# Patient Record
Sex: FEMALE | HISPANIC OR LATINO | Employment: FULL TIME | URBAN - METROPOLITAN AREA
[De-identification: names, ages, dates, MRNs, and addresses within clinical notes are randomized per-mention and may not be internally consistent; named-entity substitution may affect disease eponyms.]

---

## 2019-05-31 ENCOUNTER — OFFICE VISIT (OUTPATIENT)
Dept: INTERNAL MEDICINE | Facility: CLINIC | Age: 24
End: 2019-05-31

## 2019-05-31 VITALS
HEIGHT: 61 IN | OXYGEN SATURATION: 98 % | SYSTOLIC BLOOD PRESSURE: 102 MMHG | BODY MASS INDEX: 30.21 KG/M2 | WEIGHT: 160 LBS | DIASTOLIC BLOOD PRESSURE: 70 MMHG | TEMPERATURE: 98.2 F | HEART RATE: 76 BPM | RESPIRATION RATE: 16 BRPM

## 2019-05-31 DIAGNOSIS — J02.0 STREP THROAT: Primary | ICD-10-CM

## 2019-05-31 DIAGNOSIS — J35.1 TONSILLAR HYPERTROPHY: ICD-10-CM

## 2019-05-31 PROCEDURE — 99203 OFFICE O/P NEW LOW 30 MIN: CPT | Performed by: NURSE PRACTITIONER

## 2019-05-31 RX ORDER — AMOXICILLIN 875 MG/1
TABLET, COATED ORAL
Refills: 0 | COMMUNITY
Start: 2019-05-25 | End: 2019-07-26

## 2019-05-31 NOTE — PROGRESS NOTES
Subjective   Lizzie Farrar is a 24 y.o. female here to establish care.  Chief Complaint   Patient presents with   • Establish Care     Pt states she has been diagnosed with strep 4 times over the past year, and she would like to discuss.       Sore Throat    This is a recurrent problem. The current episode started in the past 7 days. The problem has been unchanged. Neither side of throat is experiencing more pain than the other. There has been no fever. The pain is mild. Associated symptoms include congestion. Pertinent negatives include no abdominal pain, coughing, diarrhea, drooling, ear discharge, ear pain, headaches, hoarse voice, plugged ear sensation, neck pain, shortness of breath, stridor, swollen glands, trouble swallowing or vomiting. She has had exposure to strep. She has had no exposure to mono. Treatments tried: abx. The treatment provided mild relief.    Has been diagnosed with step 4 times in the last year. Reports tonsils stay swollen. Would like to see ENT      The following portions of the patient's history were reviewed and updated as appropriate: allergies, current medications, past family history, past medical history, past social history, past surgical history and problem list.    Review of Systems   Constitutional: Negative for chills, diaphoresis, fatigue, fever and unexpected weight change.   HENT: Positive for congestion and sore throat. Negative for drooling, ear discharge, ear pain, hoarse voice and trouble swallowing.    Eyes: Negative.    Respiratory: Negative for cough, chest tightness, shortness of breath, wheezing and stridor.    Cardiovascular: Negative for chest pain, palpitations and leg swelling.   Gastrointestinal: Negative for abdominal pain, constipation, diarrhea, nausea and vomiting.   Genitourinary: Negative for difficulty urinating and dysuria.   Musculoskeletal: Negative for neck pain.   Skin: Negative for color change and rash.   Allergic/Immunologic: Negative for  "environmental allergies.   Neurological: Negative for dizziness, syncope, weakness and headaches.   Psychiatric/Behavioral: Negative for sleep disturbance.     Blood pressure 102/70, pulse 76, temperature 98.2 °F (36.8 °C), temperature source Temporal, resp. rate 16, height 154.9 cm (61\"), weight 72.6 kg (160 lb), SpO2 98 %.    No Known Allergies  Past Medical History:   Diagnosis Date   • Strep throat      Past Surgical History:   Procedure Laterality Date   • NO PAST SURGERIES       Family History   Problem Relation Age of Onset   • Hyperlipidemia Mother    • Hypertension Maternal Grandmother    • No Known Problems Father    • Cirrhosis Maternal Uncle         alcohol   • Stroke Neg Hx    • Heart attack Neg Hx    • Breast cancer Neg Hx    • Cervical cancer Neg Hx    • Lung cancer Neg Hx      Social History     Socioeconomic History   • Marital status: Single     Spouse name: Not on file   • Number of children: 0   • Years of education: Not on file   • Highest education level: Some college, no degree   Occupational History   • Occupation: Medical assistant   Social Needs   • Financial resource strain: Not hard at all   • Food insecurity:     Worry: Never true     Inability: Never true   • Transportation needs:     Medical: No     Non-medical: No   Tobacco Use   • Smoking status: Never Smoker   • Smokeless tobacco: Never Used   Substance and Sexual Activity   • Alcohol use: Yes     Frequency: Monthly or less     Drinks per session: 1 or 2     Binge frequency: Less than monthly   • Drug use: No   • Sexual activity: Yes     Partners: Male   Lifestyle   • Physical activity:     Days per week: 3 days     Minutes per session: 30 min   • Stress: Not at all   Social History Narrative    Lives with her siblings     There is no immunization history for the selected administration types on file for this patient.    Current Outpatient Medications:   •  amoxicillin (AMOXIL) 875 MG tablet, TK 1 T PO  BID FOR 10 DAYS, Disp: , Rfl: " 0    Objective   Physical Exam   Constitutional: She is oriented to person, place, and time. She appears well-developed and well-nourished.   HENT:   Head: Normocephalic and atraumatic.   Mouth/Throat: Uvula is midline and mucous membranes are normal. Posterior oropharyngeal erythema present. No posterior oropharyngeal edema. Tonsils are 2+ on the right. Tonsils are 2+ on the left. No tonsillar exudate.   Eyes: Conjunctivae are normal. Pupils are equal, round, and reactive to light.   Neck: Normal range of motion.   Cardiovascular: Normal rate, regular rhythm and normal heart sounds.   Pulmonary/Chest: Effort normal and breath sounds normal.   Abdominal: Soft. Normal appearance and bowel sounds are normal. There is no tenderness.   Musculoskeletal: Normal range of motion.   Neurological: She is alert and oriented to person, place, and time.   Skin: Skin is warm and dry.   Psychiatric: She has a normal mood and affect. Her behavior is normal. Judgment and thought content normal.       Assessment/Plan   Lizzie was seen today for establish care.    Diagnoses and all orders for this visit:    Strep throat  -     Ambulatory Referral to ENT (Otolaryngology)    Tonsillar hypertrophy  -     Ambulatory Referral to ENT (Otolaryngology)    Other orders  -     Cancel: Tdap Vaccine Greater Than or Equal To 8yo IM             Finish amoxicillin.   Refer to ENT for evaluation.   Discussed prevention of spread/reinfection with strep.   Return in about 6 weeks (around 7/12/2019) for Annual, with fasting labs, with pap.and Tdap  Plan of care discussed with pt. They verbalized understanding and agreement.       * Please note that portions of this note were completed with a voice recognition program. Efforts were made to edit the dictation but occasionally words are erroneously transcribed.

## 2019-07-26 ENCOUNTER — OFFICE VISIT (OUTPATIENT)
Dept: INTERNAL MEDICINE | Facility: CLINIC | Age: 24
End: 2019-07-26

## 2019-07-26 VITALS
WEIGHT: 156.2 LBS | BODY MASS INDEX: 29.49 KG/M2 | HEART RATE: 67 BPM | DIASTOLIC BLOOD PRESSURE: 72 MMHG | OXYGEN SATURATION: 99 % | HEIGHT: 61 IN | RESPIRATION RATE: 16 BRPM | TEMPERATURE: 98.2 F | SYSTOLIC BLOOD PRESSURE: 100 MMHG

## 2019-07-26 DIAGNOSIS — Z11.3 SCREEN FOR STD (SEXUALLY TRANSMITTED DISEASE): ICD-10-CM

## 2019-07-26 DIAGNOSIS — Z28.39 HEPATITIS A VACCINATION NOT UP TO DATE: ICD-10-CM

## 2019-07-26 DIAGNOSIS — Z23 NEED FOR TDAP VACCINATION: ICD-10-CM

## 2019-07-26 DIAGNOSIS — Z00.00 ANNUAL PHYSICAL EXAM: Primary | ICD-10-CM

## 2019-07-26 LAB
25(OH)D3 SERPL-MCNC: 11 NG/ML (ref 30–100)
ALBUMIN SERPL-MCNC: 3.9 G/DL (ref 3.5–5.2)
ALBUMIN/GLOB SERPL: 1.3 G/DL
ALP SERPL-CCNC: 54 U/L (ref 39–117)
ALT SERPL W P-5'-P-CCNC: 20 U/L (ref 1–33)
ANION GAP SERPL CALCULATED.3IONS-SCNC: 9.9 MMOL/L (ref 5–15)
AST SERPL-CCNC: 24 U/L (ref 1–32)
BILIRUB BLD-MCNC: NEGATIVE MG/DL
BILIRUB SERPL-MCNC: 0.2 MG/DL (ref 0.2–1.2)
BUN BLD-MCNC: 6 MG/DL (ref 6–20)
BUN/CREAT SERPL: 9 (ref 7–25)
CALCIUM SPEC-SCNC: 8.8 MG/DL (ref 8.6–10.5)
CHLORIDE SERPL-SCNC: 100 MMOL/L (ref 98–107)
CHOLEST SERPL-MCNC: 136 MG/DL (ref 0–200)
CLARITY, POC: CLEAR
CO2 SERPL-SCNC: 27.1 MMOL/L (ref 22–29)
COLOR UR: YELLOW
CREAT BLD-MCNC: 0.67 MG/DL (ref 0.57–1)
DEPRECATED RDW RBC AUTO: 43 FL (ref 37–54)
ERYTHROCYTE [DISTWIDTH] IN BLOOD BY AUTOMATED COUNT: 12.6 % (ref 12.3–15.4)
GFR SERPL CREATININE-BSD FRML MDRD: 108 ML/MIN/1.73
GFR SERPL CREATININE-BSD FRML MDRD: 131 ML/MIN/1.73
GLOBULIN UR ELPH-MCNC: 3 GM/DL
GLUCOSE BLD-MCNC: 93 MG/DL (ref 65–99)
GLUCOSE UR STRIP-MCNC: NEGATIVE MG/DL
HAV IGM SERPL QL IA: NORMAL
HBV CORE IGM SERPL QL IA: NORMAL
HBV SURFACE AG SERPL QL IA: NORMAL
HCT VFR BLD AUTO: 38 % (ref 34–46.6)
HCV AB SER DONR QL: NORMAL
HDLC SERPL-MCNC: 34 MG/DL (ref 40–60)
HGB BLD-MCNC: 11.5 G/DL (ref 12–15.9)
HIV1+2 AB SER QL: NORMAL
KETONES UR QL: NEGATIVE
LDLC SERPL CALC-MCNC: 85 MG/DL (ref 0–100)
LDLC/HDLC SERPL: 2.49 {RATIO}
LEUKOCYTE EST, POC: NEGATIVE
MCH RBC QN AUTO: 28 PG (ref 26.6–33)
MCHC RBC AUTO-ENTMCNC: 30.3 G/DL (ref 31.5–35.7)
MCV RBC AUTO: 92.7 FL (ref 79–97)
NITRITE UR-MCNC: NEGATIVE MG/ML
PH UR: 6 [PH] (ref 5–8)
PLATELET # BLD AUTO: 372 10*3/MM3 (ref 140–450)
PMV BLD AUTO: 10.9 FL (ref 6–12)
POTASSIUM BLD-SCNC: 3.8 MMOL/L (ref 3.5–5.2)
PROT SERPL-MCNC: 6.9 G/DL (ref 6–8.5)
PROT UR STRIP-MCNC: NEGATIVE MG/DL
RBC # BLD AUTO: 4.1 10*6/MM3 (ref 3.77–5.28)
RBC # UR STRIP: NEGATIVE /UL
SODIUM BLD-SCNC: 137 MMOL/L (ref 136–145)
SP GR UR: 1.02 (ref 1–1.03)
TRIGL SERPL-MCNC: 86 MG/DL (ref 0–150)
TSH SERPL DL<=0.05 MIU/L-ACNC: 2.23 MIU/ML (ref 0.27–4.2)
UROBILINOGEN UR QL: NORMAL
VIT B12 BLD-MCNC: 917 PG/ML (ref 211–946)
VLDLC SERPL-MCNC: 17.2 MG/DL (ref 5–40)
WBC NRBC COR # BLD: 4.3 10*3/MM3 (ref 3.4–10.8)

## 2019-07-26 PROCEDURE — 80053 COMPREHEN METABOLIC PANEL: CPT | Performed by: NURSE PRACTITIONER

## 2019-07-26 PROCEDURE — 86695 HERPES SIMPLEX TYPE 1 TEST: CPT | Performed by: NURSE PRACTITIONER

## 2019-07-26 PROCEDURE — 90472 IMMUNIZATION ADMIN EACH ADD: CPT | Performed by: NURSE PRACTITIONER

## 2019-07-26 PROCEDURE — 84443 ASSAY THYROID STIM HORMONE: CPT | Performed by: NURSE PRACTITIONER

## 2019-07-26 PROCEDURE — 99395 PREV VISIT EST AGE 18-39: CPT | Performed by: NURSE PRACTITIONER

## 2019-07-26 PROCEDURE — 82306 VITAMIN D 25 HYDROXY: CPT | Performed by: NURSE PRACTITIONER

## 2019-07-26 PROCEDURE — 80061 LIPID PANEL: CPT | Performed by: NURSE PRACTITIONER

## 2019-07-26 PROCEDURE — 87340 HEPATITIS B SURFACE AG IA: CPT | Performed by: NURSE PRACTITIONER

## 2019-07-26 PROCEDURE — G0432 EIA HIV-1/HIV-2 SCREEN: HCPCS | Performed by: NURSE PRACTITIONER

## 2019-07-26 PROCEDURE — 81003 URINALYSIS AUTO W/O SCOPE: CPT | Performed by: NURSE PRACTITIONER

## 2019-07-26 PROCEDURE — 86592 SYPHILIS TEST NON-TREP QUAL: CPT | Performed by: NURSE PRACTITIONER

## 2019-07-26 PROCEDURE — 86696 HERPES SIMPLEX TYPE 2 TEST: CPT | Performed by: NURSE PRACTITIONER

## 2019-07-26 PROCEDURE — 80074 ACUTE HEPATITIS PANEL: CPT | Performed by: NURSE PRACTITIONER

## 2019-07-26 PROCEDURE — 90715 TDAP VACCINE 7 YRS/> IM: CPT | Performed by: NURSE PRACTITIONER

## 2019-07-26 PROCEDURE — 85027 COMPLETE CBC AUTOMATED: CPT | Performed by: NURSE PRACTITIONER

## 2019-07-26 PROCEDURE — 82607 VITAMIN B-12: CPT | Performed by: NURSE PRACTITIONER

## 2019-07-26 PROCEDURE — 90632 HEPA VACCINE ADULT IM: CPT | Performed by: NURSE PRACTITIONER

## 2019-07-26 PROCEDURE — 90471 IMMUNIZATION ADMIN: CPT | Performed by: NURSE PRACTITIONER

## 2019-07-26 NOTE — PROGRESS NOTES
Patient Care Team:  Milagro Wallis APRN as PCP - General (Nurse Practitioner)     Chief complaint: Patient is in today for a physical          Patient is a 24 y.o. female who presents for her yearly physical exam.     HPI      Had tonsillectomy 2 weeks ago.       Sexually active: sexually active, uses condoms.   Patient's last menstrual period was 07/07/2019.  Menses regular. Painful, takes ibuprofen, which  helps.       SBE: occasional. No concerning areas.   Sunscreen: wears    Health maintenance:  Tdap: DUE  Hep A: never  Pap: never  Tobacco use: denies  Eye exam: 2 years ago   Dental exam: 6 m ago.     Diet: eats fruits and veggies, eats out about 3 days    Exercise: none        Health Maintenance Summary       Status Date      HPV VACCINES Overdue 2/2/2010     HEPATITIS A VACCINE ADULT Overdue 2/2/2013     TDAP/TD VACCINES Overdue 2/2/2014     CHLAMYDIA SCREENING Overdue 5/31/2019     PAP SMEAR Overdue 5/31/2019     INFLUENZA VACCINE Next Due 8/1/2019     ANNUAL PHYSICAL Next Due 7/27/2020      Done 7/26/2019 Registry Metric: Last Annual Physical     Patient has more history with this topic...        PHQ-2 Depression Screening  Little interest or pleasure in doing things? 0   Feeling down, depressed, or hopeless? 0   PHQ-2 Total Score 0         Review of Systems   Constitutional: Negative for appetite change, chills, fatigue, fever and unexpected weight change.   HENT: Negative for congestion, ear pain, rhinorrhea, sinus pressure and sore throat.    Eyes: Negative for itching and visual disturbance.   Respiratory: Negative for cough, chest tightness, shortness of breath and wheezing.    Cardiovascular: Negative for chest pain, palpitations and leg swelling.   Gastrointestinal: Negative for abdominal pain, constipation, diarrhea, nausea and vomiting.   Endocrine: Negative for cold intolerance and heat intolerance.   Genitourinary: Negative for difficulty urinating, dysuria, genital sores and hematuria.    Musculoskeletal: Negative for arthralgias, back pain, joint swelling and myalgias.   Skin: Negative for rash and wound.   Allergic/Immunologic: Negative for environmental allergies and food allergies.   Neurological: Negative for dizziness, numbness and headaches.   Hematological: Negative for adenopathy. Does not bruise/bleed easily.   Psychiatric/Behavioral: Negative for dysphoric mood and sleep disturbance. The patient is not nervous/anxious.          History  Past Medical History:   Diagnosis Date   • Strep throat       Past Surgical History:   Procedure Laterality Date   • NO PAST SURGERIES     • TONSILLECTOMY  2019      No Known Allergies   Family History   Problem Relation Age of Onset   • Hyperlipidemia Mother    • Hypertension Maternal Grandmother    • No Known Problems Father    • Cirrhosis Maternal Uncle         alcohol   • Stroke Neg Hx    • Heart attack Neg Hx    • Breast cancer Neg Hx    • Cervical cancer Neg Hx    • Lung cancer Neg Hx      Social History     Socioeconomic History   • Marital status: Single     Spouse name: Not on file   • Number of children: 0   • Years of education: Not on file   • Highest education level: Some college, no degree   Occupational History   • Occupation: Medical assistant   Social Needs   • Financial resource strain: Not hard at all   • Food insecurity:     Worry: Never true     Inability: Never true   • Transportation needs:     Medical: No     Non-medical: No   Tobacco Use   • Smoking status: Never Smoker   • Smokeless tobacco: Never Used   Substance and Sexual Activity   • Alcohol use: Yes     Frequency: Monthly or less     Drinks per session: 1 or 2     Binge frequency: Less than monthly   • Drug use: No   • Sexual activity: Yes     Partners: Male   Lifestyle   • Physical activity:     Days per week: 3 days     Minutes per session: 30 min   • Stress: Not at all   Social History Narrative    Lives with her siblings      No current outpatient medications on file.  "  Immunization History   Administered Date(s) Administered   • Hepatitis A 07/26/2019   • Tdap 07/26/2019                   /72   Pulse 67   Temp 98.2 °F (36.8 °C)   Resp 16   Ht 154.9 cm (61\")   Wt 70.9 kg (156 lb 3.2 oz)   LMP 07/07/2019   SpO2 99%   Breastfeeding? No   BMI 29.51 kg/m²       Physical Exam   Constitutional: She is oriented to person, place, and time. She appears well-developed and well-nourished. No distress.   HENT:   Head: Normocephalic and atraumatic.   Right Ear: External ear normal.   Left Ear: External ear normal.   Mouth/Throat: Oropharynx is clear and moist. No oropharyngeal exudate.   Eyes: Conjunctivae and EOM are normal. Right eye exhibits no discharge. Left eye exhibits no discharge. No scleral icterus.   Neck: Normal range of motion. Neck supple. No JVD present. Carotid bruit is not present. No tracheal deviation present. No thyromegaly present.   Cardiovascular: Normal rate, regular rhythm, normal heart sounds and intact distal pulses. Exam reveals no friction rub.   No murmur heard.  Pulmonary/Chest: Effort normal and breath sounds normal. No respiratory distress. She has no wheezes. She has no rales. She exhibits no tenderness. Right breast exhibits no inverted nipple, no mass, no nipple discharge, no skin change and no tenderness. Left breast exhibits no inverted nipple, no mass, no nipple discharge, no skin change and no tenderness. Breasts are symmetrical.   Abdominal: Soft. Normal appearance and bowel sounds are normal. She exhibits no distension and no mass. There is no hepatosplenomegaly. There is no tenderness. No hernia.   Genitourinary: Vagina normal and uterus normal. Pelvic exam was performed with patient supine. No labial fusion. There is no rash, tenderness, lesion or injury on the right labia. There is no rash, tenderness, lesion or injury on the left labia. Cervix exhibits no motion tenderness, no discharge and no friability. Right adnexum displays no " mass, no tenderness and no fullness. Left adnexum displays no mass, no tenderness and no fullness.   Musculoskeletal: Normal range of motion. She exhibits no edema, tenderness or deformity.   Lymphadenopathy:     She has no cervical adenopathy.   Neurological: She is alert and oriented to person, place, and time. She has normal reflexes. She displays normal reflexes.   Skin: Skin is warm and dry. No rash noted. She is not diaphoretic. No erythema. No pallor.   Psychiatric: She has a normal mood and affect. Her behavior is normal. Thought content normal.   Nursing note and vitals reviewed.                Diagnoses and all orders for this visit:    Annual physical exam  -     CBC (No Diff)  -     Comprehensive Metabolic Panel  -     Lipid Panel  -     TSH  -     Vitamin B12  -     Vitamin D 25 Hydroxy  -     POC Urinalysis Dipstick, Automated    Screen for STD (sexually transmitted disease)  -     Liquid-based Pap Smear, Screening  -     Hepatitis Panel, Acute  -     HIV-1 / O / 2 Ag / Antibody 4th Generation  -     HSV 1 & 2 IgM, Antibodies, Indirect  -     HSV 1 & 2 - Specific Antibody, IgG  -     RPR    Hepatitis A vaccination not up to date  -     Hepatitis A Vaccine Adult IM    Need for Tdap vaccination  -     Tdap Vaccine Greater Than or Equal To 6yo IM    Other orders  -     Cancel: Chlamydia trachomatis, Neisseria gonorrhoeae, Trichomonas vaginalis, PCR - Urine, Urine, Clean Catch         Labs sent  Immunizations and screenings  She needs to schedule eye exam, pap and breast exam completed, Tdap updated, hep  A updated, she will return in 6 m for second hep A, HPV at health dept(contact info provided)  Counseling: diet and exercise, safe  Follow up: Return in about 1 year (around 7/26/2020) for Annual and , 6 m nurse visit for Hep A vaccine.  Plan of care discussed with pt. They verbalized understanding and agreement.     Milagro Wallis, APRN   7/26/2019   9:36 AM              * Please note that portions of  this note were completed with a voice recognition program. Efforts were made to edit the dictation but occasionally words are erroneously transcribed.

## 2019-07-26 NOTE — PATIENT INSTRUCTIONS
Calorie Counting for Weight Loss  Calories are units of energy. Your body needs a certain amount of calories from food to keep you going throughout the day. When you eat more calories than your body needs, your body stores the extra calories as fat. When you eat fewer calories than your body needs, your body burns fat to get the energy it needs.  Calorie counting means keeping track of how many calories you eat and drink each day. Calorie counting can be helpful if you need to lose weight. If you make sure to eat fewer calories than your body needs, you should lose weight. Ask your health care provider what a healthy weight is for you.  For calorie counting to work, you will need to eat the right number of calories in a day in order to lose a healthy amount of weight per week. A dietitian can help you determine how many calories you need in a day and will give you suggestions on how to reach your calorie goal.  · A healthy amount of weight to lose per week is usually 1-2 lb (0.5-0.9 kg). This usually means that your daily calorie intake should be reduced by 500-750 calories.  · Eating 1,200 - 1,500 calories per day can help most women lose weight.  · Eating 1,500 - 1,800 calories per day can help most men lose weight.    What is my plan?  My goal is to have __________ calories per day.  If I have this many calories per day, I should lose around __________ pounds per week.  What do I need to know about calorie counting?  In order to meet your daily calorie goal, you will need to:  · Find out how many calories are in each food you would like to eat. Try to do this before you eat.  · Decide how much of the food you plan to eat.  · Write down what you ate and how many calories it had. Doing this is called keeping a food log.    To successfully lose weight, it is important to balance calorie counting with a healthy lifestyle that includes regular activity. Aim for 150 minutes of moderate exercise (such as walking) or 75  minutes of vigorous exercise (such as running) each week.  Where do I find calorie information?    The number of calories in a food can be found on a Nutrition Facts label. If a food does not have a Nutrition Facts label, try to look up the calories online or ask your dietitian for help.  Remember that calories are listed per serving. If you choose to have more than one serving of a food, you will have to multiply the calories per serving by the amount of servings you plan to eat. For example, the label on a package of bread might say that a serving size is 1 slice and that there are 90 calories in a serving. If you eat 1 slice, you will have eaten 90 calories. If you eat 2 slices, you will have eaten 180 calories.  How do I keep a food log?  Immediately after each meal, record the following information in your food log:  · What you ate. Don't forget to include toppings, sauces, and other extras on the food.  · How much you ate. This can be measured in cups, ounces, or number of items.  · How many calories each food and drink had.  · The total number of calories in the meal.    Keep your food log near you, such as in a small notebook in your pocket, or use a mobile maura or website. Some programs will calculate calories for you and show you how many calories you have left for the day to meet your goal.  What are some calorie counting tips?  · Use your calories on foods and drinks that will fill you up and not leave you hungry:  ? Some examples of foods that fill you up are nuts and nut butters, vegetables, lean proteins, and high-fiber foods like whole grains. High-fiber foods are foods with more than 5 g fiber per serving.  ? Drinks such as sodas, specialty coffee drinks, alcohol, and juices have a lot of calories, yet do not fill you up.  · Eat nutritious foods and avoid empty calories. Empty calories are calories you get from foods or beverages that do not have many vitamins or protein, such as candy, sweets, and  "soda. It is better to have a nutritious high-calorie food (such as an avocado) than a food with few nutrients (such as a bag of chips).  · Know how many calories are in the foods you eat most often. This will help you calculate calorie counts faster.  · Pay attention to calories in drinks. Low-calorie drinks include water and unsweetened drinks.  · Pay attention to nutrition labels for \"low fat\" or \"fat free\" foods. These foods sometimes have the same amount of calories or more calories than the full fat versions. They also often have added sugar, starch, or salt, to make up for flavor that was removed with the fat.  · Find a way of tracking calories that works for you. Get creative. Try different apps or programs if writing down calories does not work for you.  What are some portion control tips?  · Know how many calories are in a serving. This will help you know how many servings of a certain food you can have.  · Use a measuring cup to measure serving sizes. You could also try weighing out portions on a kitchen scale. With time, you will be able to estimate serving sizes for some foods.  · Take some time to put servings of different foods on your favorite plates, bowls, and cups so you know what a serving looks like.  · Try not to eat straight from a bag or box. Doing this can lead to overeating. Put the amount you would like to eat in a cup or on a plate to make sure you are eating the right portion.  · Use smaller plates, glasses, and bowls to prevent overeating.  · Try not to multitask (for example, watch TV or use your computer) while eating. If it is time to eat, sit down at a table and enjoy your food. This will help you to know when you are full. It will also help you to be aware of what you are eating and how much you are eating.  What are tips for following this plan?  Reading food labels  · Check the calorie count compared to the serving size. The serving size may be smaller than what you are used to " eating.  · Check the source of the calories. Make sure the food you are eating is high in vitamins and protein and low in saturated and trans fats.  Shopping  · Read nutrition labels while you shop. This will help you make healthy decisions before you decide to purchase your food.  · Make a grocery list and stick to it.  Cooking  · Try to cook your favorite foods in a healthier way. For example, try baking instead of frying.  · Use low-fat dairy products.  Meal planning  · Use more fruits and vegetables. Half of your plate should be fruits and vegetables.  · Include lean proteins like poultry and fish.  How do I count calories when eating out?  · Ask for smaller portion sizes.  · Consider sharing an entree and sides instead of getting your own entree.  · If you get your own entree, eat only half. Ask for a box at the beginning of your meal and put the rest of your entree in it so you are not tempted to eat it.  · If calories are listed on the menu, choose the lower calorie options.  · Choose dishes that include vegetables, fruits, whole grains, low-fat dairy products, and lean protein.  · Choose items that are boiled, broiled, grilled, or steamed. Stay away from items that are buttered, battered, fried, or served with cream sauce. Items labeled “crispy” are usually fried, unless stated otherwise.  · Choose water, low-fat milk, unsweetened iced tea, or other drinks without added sugar. If you want an alcoholic beverage, choose a lower calorie option such as a glass of wine or light beer.  · Ask for dressings, sauces, and syrups on the side. These are usually high in calories, so you should limit the amount you eat.  · If you want a salad, choose a garden salad and ask for grilled meats. Avoid extra toppings like benjamin, cheese, or fried items. Ask for the dressing on the side, or ask for olive oil and vinegar or lemon to use as dressing.  · Estimate how many servings of a food you are given. For example, a serving of  cooked rice is ½ cup or about the size of half a baseball. Knowing serving sizes will help you be aware of how much food you are eating at restaurants. The list below tells you how big or small some common portion sizes are based on everyday objects:  ? 1 oz--4 stacked dice.  ? 3 oz--1 deck of cards.  ? 1 tsp--1 die.  ? 1 Tbsp--½ a ping-pong ball.  ? 2 Tbsp--1 ping-pong ball.  ? ½ cup--½ baseball.  ? 1 cup--1 baseball.  Summary  · Calorie counting means keeping track of how many calories you eat and drink each day. If you eat fewer calories than your body needs, you should lose weight.  · A healthy amount of weight to lose per week is usually 1-2 lb (0.5-0.9 kg). This usually means reducing your daily calorie intake by 500-750 calories.  · The number of calories in a food can be found on a Nutrition Facts label. If a food does not have a Nutrition Facts label, try to look up the calories online or ask your dietitian for help.  · Use your calories on foods and drinks that will fill you up, and not on foods and drinks that will leave you hungry.  · Use smaller plates, glasses, and bowls to prevent overeating.  This information is not intended to replace advice given to you by your health care provider. Make sure you discuss any questions you have with your health care provider.  Document Released: 12/18/2006 Document Revised: 11/17/2017 Document Reviewed: 11/17/2017  [employer] Interactive Patient Education © 2019 [employer]      Exercising to Lose Weight  Exercising can help you to lose weight. In order to lose weight through exercise, you need to do vigorous-intensity exercise. You can tell that you are exercising with vigorous intensity if you are breathing very hard and fast and cannot hold a conversation while exercising.  Moderate-intensity exercise helps to maintain your current weight. You can tell that you are exercising at a moderate level if you have a higher heart rate and faster breathing, but you are  still able to hold a conversation.  How often should I exercise?  Choose an activity that you enjoy and set realistic goals. Your health care provider can help you to make an activity plan that works for you. Exercise regularly as directed by your health care provider. This may include:  · Doing resistance training twice each week, such as:  ? Push-ups.  ? Sit-ups.  ? Lifting weights.  ? Using resistance bands.  · Doing a given intensity of exercise for a given amount of time. Choose from these options:  ? 150 minutes of moderate-intensity exercise every week.  ? 75 minutes of vigorous-intensity exercise every week.  ? A mix of moderate-intensity and vigorous-intensity exercise every week.    Children, pregnant women, people who are out of shape, people who are overweight, and older adults may need to consult a health care provider for individual recommendations. If you have any sort of medical condition, be sure to consult your health care provider before starting a new exercise program.  What are some activities that can help me to lose weight?  · Walking at a rate of at least 4.5 miles an hour.  · Jogging or running at a rate of 5 miles per hour.  · Biking at a rate of at least 10 miles per hour.  · Lap swimming.  · Roller-skating or in-line skating.  · Cross-country skiing.  · Vigorous competitive sports, such as football, basketball, and soccer.  · Jumping rope.  · Aerobic dancing.  How can I be more active in my day-to-day activities?  · Use the stairs instead of the elevator.  · Take a walk during your lunch break.  · If you drive, park your car farther away from work or school.  · If you take public transportation, get off one stop early and walk the rest of the way.  · Make all of your phone calls while standing up and walking around.  · Get up, stretch, and walk around every 30 minutes throughout the day.  What guidelines should I follow while exercising?  · Do not exercise so much that you hurt yourself,  feel dizzy, or get very short of breath.  · Consult your health care provider prior to starting a new exercise program.  · Wear comfortable clothes and shoes with good support.  · Drink plenty of water while you exercise to prevent dehydration or heat stroke. Body water is lost during exercise and must be replaced.  · Work out until you breathe faster and your heart beats faster.  This information is not intended to replace advice given to you by your health care provider. Make sure you discuss any questions you have with your health care provider.  Document Released: 01/20/2012 Document Revised: 05/25/2017 Document Reviewed: 05/21/2015  [employer] Interactive Patient Education © 2019 [employer]

## 2019-07-27 LAB
HSV1 IGG SER IA-ACNC: 45.9 INDEX (ref 0–0.9)
HSV2 IGG SER IA-ACNC: <0.91 INDEX (ref 0–0.9)
RPR SER QL: NORMAL

## 2019-07-29 LAB
HSV1 IGM TITR SER IF: NORMAL TITER
HSV2 IGM TITR SER IF: NORMAL TITER

## 2019-07-30 DIAGNOSIS — E55.9 VITAMIN D DEFICIENCY: Primary | ICD-10-CM

## 2019-07-30 RX ORDER — CHOLECALCIFEROL (VITAMIN D3) 1250 MCG
50000 CAPSULE ORAL
Qty: 8 CAPSULE | Refills: 0 | Status: SHIPPED | OUTPATIENT
Start: 2019-07-30 | End: 2019-09-18

## 2019-07-31 ENCOUNTER — TELEPHONE (OUTPATIENT)
Dept: INTERNAL MEDICINE | Facility: CLINIC | Age: 24
End: 2019-07-31

## 2019-07-31 NOTE — TELEPHONE ENCOUNTER
----- Message from YEE Reese sent at 7/30/2019  9:37 AM EDT -----  Please let patient know that labs show low vitamin D.  I will send in vitamin D3 50,000 units for her to take weekly for 8 weeks.  After that she can take vitamin D3 over-the-counter 1000 units daily.  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   Pap results still pending

## 2019-08-02 ENCOUNTER — TELEPHONE (OUTPATIENT)
Dept: INTERNAL MEDICINE | Facility: CLINIC | Age: 24
End: 2019-08-02

## 2019-08-02 DIAGNOSIS — R87.612 LGSIL ON PAP SMEAR OF CERVIX: Primary | ICD-10-CM

## 2019-08-02 NOTE — TELEPHONE ENCOUNTER
----- Message from YEE Reese sent at 8/2/2019  2:05 PM EDT -----  Please let her know her pap showed some abnormal cells. We need to get this further evaluated to determine what it means. I will refer to GYN for this evaluation

## 2019-08-02 NOTE — TELEPHONE ENCOUNTER
Patient is requesting a call for the referral.  She wants to know results of pap and why she is being sent to GYN.  Her PH is [phone]

## 2019-08-30 ENCOUNTER — OFFICE VISIT (OUTPATIENT)
Dept: OBSTETRICS AND GYNECOLOGY | Facility: CLINIC | Age: 24
End: 2019-08-30

## 2019-08-30 VITALS
WEIGHT: 158.2 LBS | DIASTOLIC BLOOD PRESSURE: 64 MMHG | BODY MASS INDEX: 31.06 KG/M2 | RESPIRATION RATE: 14 BRPM | HEIGHT: 60 IN | SYSTOLIC BLOOD PRESSURE: 98 MMHG

## 2019-08-30 DIAGNOSIS — Z30.09 ENCOUNTER FOR GENERAL COUNSELING AND ADVICE ON CONTRACEPTIVE MANAGEMENT: Primary | ICD-10-CM

## 2019-08-30 PROCEDURE — 99203 OFFICE O/P NEW LOW 30 MIN: CPT | Performed by: OBSTETRICS & GYNECOLOGY

## 2019-09-16 ENCOUNTER — OFFICE VISIT (OUTPATIENT)
Dept: OBSTETRICS AND GYNECOLOGY | Facility: CLINIC | Age: 24
End: 2019-09-16

## 2019-09-16 VITALS
RESPIRATION RATE: 14 BRPM | HEIGHT: 60 IN | BODY MASS INDEX: 31.8 KG/M2 | DIASTOLIC BLOOD PRESSURE: 74 MMHG | WEIGHT: 162 LBS | SYSTOLIC BLOOD PRESSURE: 108 MMHG

## 2019-09-16 DIAGNOSIS — R87.612 LGSIL ON PAP SMEAR OF CERVIX: Primary | ICD-10-CM

## 2019-09-16 PROCEDURE — 57456 ENDOCERV CURETTAGE W/SCOPE: CPT | Performed by: OBSTETRICS & GYNECOLOGY

## 2019-09-16 NOTE — PROGRESS NOTES
Colposcopy    Date of procedure:  9/16/2019    Risks and benefits discussed? yes  All questions answered? yes  Consents given by the patient  Written consent obtained? yes    Pre-op indication: LGSIL - mild dysplasia  Procedure documentation:    The cervix was initially viewed colposcopically through a green filter.  The cervix was next bathed in acetic acid.   The findings were as follows:      The transformation zone was not able to be seen adequately.    Findings: No visible lesions     Ectocervical biopsies were not performed    An ECC was performed.      Colposcopic Impression: 1. Inadequate colposcopy  2. Colposcopic findings are inconsistent with PAP       Plan: Will base further treatment on pathology results   Post biopsy instructions given to patient.   Specimens labelled and sent to pathology.   follow-up in 4 month(s) for repap      This note was electronically signed.    Shun Perez M.D. EDDIE.  September 16, 2019

## 2019-10-25 ENCOUNTER — OFFICE VISIT (OUTPATIENT)
Dept: OBSTETRICS AND GYNECOLOGY | Facility: CLINIC | Age: 24
End: 2019-10-25

## 2019-10-25 VITALS
SYSTOLIC BLOOD PRESSURE: 102 MMHG | WEIGHT: 160 LBS | BODY MASS INDEX: 31.41 KG/M2 | HEIGHT: 60 IN | DIASTOLIC BLOOD PRESSURE: 58 MMHG

## 2019-10-25 DIAGNOSIS — Z30.017 NEXPLANON INSERTION: Primary | ICD-10-CM

## 2019-10-25 LAB
B-HCG UR QL: NEGATIVE
INTERNAL NEGATIVE CONTROL: NEGATIVE
INTERNAL POSITIVE CONTROL: POSITIVE
Lab: NORMAL

## 2019-10-25 PROCEDURE — 11981 INSERTION DRUG DLVR IMPLANT: CPT | Performed by: OBSTETRICS & GYNECOLOGY

## 2019-10-25 PROCEDURE — 81025 URINE PREGNANCY TEST: CPT | Performed by: OBSTETRICS & GYNECOLOGY

## 2019-10-25 NOTE — PROGRESS NOTES
Nexplanon Insertion    Patient's last menstrual period was 10/14/2019 (exact date).    Date of procedure:  10/25/2019    Risks and benefits discussed? yes  All questions answered? yes  Consents given by the patient  Written consent obtained? yes    Local anesthesia used:  yes - 4 cc's of  Meds; anesthesia local: 1% lidocaine    Procedure documentation:    The upper left arm (non-dominant) was marked at the intended site of insertion.  Betadine was used to cleanse the skin.  Local anesthesia was injected.  The Nexplanon was placed subdermally without difficulty.  The devise was able to be palpated in the arm by both myself and Lizzie.  Steri-strips were then placed across the site of insertion and the arm was wrapped.    She tolerated the procedure well.  There were no complications.  EBL was minimal.    Post procedure instructions: Remove the wrapping in 24 hours and the steri-strips in 5 days.    Follow up needed: PRN    This note was electronically signed.    Gabbi Ramírez,     October 25, 2019

## 2019-12-06 ENCOUNTER — OFFICE VISIT (OUTPATIENT)
Dept: INTERNAL MEDICINE | Facility: CLINIC | Age: 24
End: 2019-12-06

## 2019-12-06 VITALS
BODY MASS INDEX: 31.06 KG/M2 | RESPIRATION RATE: 16 BRPM | SYSTOLIC BLOOD PRESSURE: 106 MMHG | WEIGHT: 158.2 LBS | DIASTOLIC BLOOD PRESSURE: 70 MMHG | OXYGEN SATURATION: 98 % | HEART RATE: 77 BPM | HEIGHT: 60 IN | TEMPERATURE: 98.2 F

## 2019-12-06 DIAGNOSIS — F41.9 ANXIETY: Primary | ICD-10-CM

## 2019-12-06 DIAGNOSIS — F32.1 MODERATE MAJOR DEPRESSION (HCC): ICD-10-CM

## 2019-12-06 PROCEDURE — 99213 OFFICE O/P EST LOW 20 MIN: CPT | Performed by: NURSE PRACTITIONER

## 2019-12-06 NOTE — PROGRESS NOTES
Subjective   Lzizie Farrar is a 24 y.o. female.     Chief Complaint   Patient presents with   • Depression       Depression   Visit Type: initial  Onset of symptoms: more than 5 years ago  Progression since onset: gradually worsening  Patient presents with the following symptoms: anhedonia, decreased concentration, depressed mood, excessive worry, fatigue, feelings of hopelessness, feelings of worthlessness, irritability, malaise, muscle tension and nervousness/anxiety.  Patient is not experiencing: chest pain, choking sensation, compulsions, confusion, dizziness, dry mouth, hypersomnia, hyperventilation, impotence, insomnia, nausea, obsessions, palpitations, panic, psychomotor agitation, psychomotor retardation, restlessness, shortness of breath, suicidal ideas, thoughts of death and weight loss.  Frequency of symptoms: constantly   Severity: moderate   Aggravated by: family issues  Sleep quality: good  Risk factors: major life event and previous episode of depression  Patient has a history of: anxiety/panic attacks and depression  No history of: suicide attempt  Treatment tried: nothing        Patient reports that she has been dealing with some anxiety and depression since she was a small child.  She was originally from [place] and moved to [place] when she was 11 years old.  She reports that she never had a really stable home life as her mother and father were both frequently back and forth between [place] and [place].  She reports her father was an alcoholic and that has caused some issues for her with feeling anxious and depressed and having difficulty with coping with her own feelings.  She denies any abuse.      She had a normal CBC, CMP, and TSH in July 2019.  There is no possibility of pregnancy: She has a Nexplanon in place    BOSSMAN 7 scores a 12 in office today.  Please see scanned form in epic.  .PHQ-9 Depression Screening  Little interest or pleasure in doing things? 2   Feeling down,  depressed, or hopeless? 2   Trouble falling or staying asleep, or sleeping too much? 0   Feeling tired or having little energy? 1   Poor appetite or overeating? 2   Feeling bad about yourself - or that you are a failure or have let yourself or your family down? 2   Trouble concentrating on things, such as reading the newspaper or watching television? 1   Moving or speaking so slowly that other people could have noticed? Or the opposite - being so fidgety or restless that you have been moving around a lot more than usual? 1   Thoughts that you would be better off dead, or of hurting yourself in some way? 0   PHQ-9 Total Score 11   If you checked off any problems, how difficult have these problems made it for you to do your work, take care of things at home, or get along with other people? Somewhat difficult         The following portions of the patient's history were reviewed and updated as appropriate: allergies, current medications, past family history, past medical history, past social history, past surgical history and problem list.    Review of Systems   Constitutional: Positive for irritability. Negative for appetite change, chills, diaphoresis, fatigue, unexpected weight change and weight loss.   Eyes: Negative for visual disturbance.   Respiratory: Negative for cough, choking, chest tightness, shortness of breath and wheezing.    Cardiovascular: Negative for chest pain, palpitations and leg swelling.   Gastrointestinal: Negative for constipation, diarrhea, nausea and vomiting.   Endocrine: Negative for polydipsia, polyphagia and polyuria.   Genitourinary: Negative for difficulty urinating, dysuria and impotence.   Skin: Negative for color change and rash.   Neurological: Negative for dizziness, syncope, weakness, light-headedness, numbness and headaches.   Psychiatric/Behavioral: Positive for decreased concentration and dysphoric mood. Negative for agitation, behavioral problems, confusion, hallucinations,  "self-injury, sleep disturbance and suicidal ideas. The patient is nervous/anxious. The patient does not have insomnia and is not hyperactive.        Outpatient Medications Marked as Taking for the 12/6/19 encounter (Office Visit) with Milagro Wallis APRN   Medication Sig Dispense Refill   • Etonogestrel (NEXPLANON) 68 MG implant subdermal implant Inject 1 each into the appropriate area of the skin as directed by provider 1 (One) Time.             Objective   Physical Exam   Constitutional: She is oriented to person, place, and time. She appears well-developed and well-nourished. No distress.   HENT:   Head: Normocephalic and atraumatic.   Eyes: Conjunctivae are normal. Pupils are equal, round, and reactive to light.   Neck: Normal range of motion. No JVD present.   Cardiovascular: Normal rate, regular rhythm, normal heart sounds and intact distal pulses.   No murmur heard.  Pulmonary/Chest: Effort normal and breath sounds normal. No respiratory distress. She exhibits no tenderness.   Abdominal: Soft. Normal appearance and bowel sounds are normal. She exhibits no distension. There is no tenderness.   Musculoskeletal: Normal range of motion. She exhibits no edema.   Neurological: She is alert and oriented to person, place, and time.   Skin: Skin is warm and dry. She is not diaphoretic. No erythema. No pallor.   Psychiatric: Her speech is normal and behavior is normal. Judgment and thought content normal. Her mood appears anxious. Cognition and memory are normal. She does not express impulsivity. She exhibits a depressed mood. She expresses no homicidal and no suicidal ideation.   Patient slightly tearful during encounter as she describes her symptoms and childhood.   Nursing note and vitals reviewed.      Vitals:    12/06/19 1226   BP: 106/70   Pulse: 77   Resp: 16   Temp: 98.2 °F (36.8 °C)   SpO2: 98%   Weight: 71.8 kg (158 lb 3.2 oz)   Height: 152.4 cm (60\")     Body mass index is 30.9 " kg/m².        Assessment/Plan   Lizzie was seen today for depression.    Diagnoses and all orders for this visit:    Anxiety  -     Ambulatory Referral to Psychiatry  -     sertraline (ZOLOFT) 50 MG tablet; Take 1 tablet by mouth Daily.    Moderate major depression (CMS/HCC)  -     Ambulatory Referral to Psychiatry  -     sertraline (ZOLOFT) 50 MG tablet; Take 1 tablet by mouth Daily.         I discussed management of anxiety depression with patient at length.  She has opted to go with counseling and medications at this time.  We will refer her to psychiatry.  I will also start her on Zoloft daily.  Adverse effects of been discussed.  She will follow-up in about 4 weeks to discuss symptoms.  She can contact me if symptoms are worsening in the meantime.  If she were to develop any suicidal or homicidal ideation she would need to go to the emergency department.  Patient verbalized understanding.       Return in about 4 weeks (around 1/3/2020), or if symptoms worsen or fail to improve.  I discussed my findings and recommendations with patient.  The plan of care was  discussed with patient. They verbalized understanding and agreement.  Patient was encouraged to keep me informed of any acute changes, lack of improvement, or any new concerning symptoms.       * Please note that portions of this note were completed with a voice recognition program. Efforts were made to edit the dictation but occasionally words are erroneously transcribed.

## 2020-01-10 ENCOUNTER — OFFICE VISIT (OUTPATIENT)
Dept: INTERNAL MEDICINE | Facility: CLINIC | Age: 25
End: 2020-01-10

## 2020-01-10 VITALS
HEART RATE: 62 BPM | HEIGHT: 60 IN | BODY MASS INDEX: 31.22 KG/M2 | DIASTOLIC BLOOD PRESSURE: 70 MMHG | OXYGEN SATURATION: 99 % | WEIGHT: 159 LBS | SYSTOLIC BLOOD PRESSURE: 106 MMHG | TEMPERATURE: 98.4 F | RESPIRATION RATE: 16 BRPM

## 2020-01-10 DIAGNOSIS — F32.1 MODERATE MAJOR DEPRESSION (HCC): ICD-10-CM

## 2020-01-10 DIAGNOSIS — F41.9 ANXIETY: Primary | ICD-10-CM

## 2020-01-10 PROCEDURE — 99213 OFFICE O/P EST LOW 20 MIN: CPT | Performed by: NURSE PRACTITIONER

## 2020-01-10 RX ORDER — HYDROXYZINE PAMOATE 25 MG/1
CAPSULE ORAL
COMMUNITY
Start: 2020-01-03 | End: 2021-03-17 | Stop reason: SDUPTHER

## 2020-01-10 RX ORDER — QUETIAPINE FUMARATE 50 MG/1
TABLET, FILM COATED ORAL
COMMUNITY
Start: 2020-01-03 | End: 2021-03-17 | Stop reason: SDUPTHER

## 2020-01-10 RX ORDER — SERTRALINE HYDROCHLORIDE 25 MG/1
TABLET, FILM COATED ORAL
COMMUNITY
Start: 2020-01-03 | End: 2021-03-17

## 2020-01-10 NOTE — PROGRESS NOTES
"Subjective   Lizzie Farrar is a 24 y.o. female.     Chief Complaint   Patient presents with   • Anxiety     Seeing Garrett at [facility].  Weaning off zoloft- was on 50 mg. 1 more day of 25 mg, start 12.5 tomorrow   • Depression       History of Present Illness     Patient is here to discuss some ongoing anxiety and depression.  At her last visit in December 2019 we started her on some Zoloft for this.  Since that visit she has established Garrett at [facility].  She reports that the Zoloft made her \"crazy\" and made her have severe panic attacks.  She is in the process of weaning off the Zoloft through Garrett and he is started her on Seroquel.  She reports a significant improvement in her symptoms.  She does still has several days a week where she feels nervous or anxious has difficult control worrying about multiple different things and trouble relaxing and becoming easily annoyed.      Her BOSSMAN 7 score is a 6 in office today.  Please see scanned form in epic.        The following portions of the patient's history were reviewed and updated as appropriate: allergies, current medications, past family history, past medical history, past social history, past surgical history and problem list.    Review of Systems   Constitutional: Negative for appetite change, chills, fatigue and unexpected weight change.   Respiratory: Negative for cough, chest tightness, shortness of breath and wheezing.    Cardiovascular: Negative for chest pain, palpitations and leg swelling.   Gastrointestinal: Negative for abdominal distention, constipation, diarrhea, nausea and vomiting.   Genitourinary: Negative for difficulty urinating and dysuria.   Skin: Negative for color change and rash.   Neurological: Negative for dizziness, syncope, weakness, light-headedness and headaches.   Psychiatric/Behavioral: Positive for decreased concentration. Negative for agitation, behavioral problems, confusion, dysphoric mood, " "hallucinations, self-injury, sleep disturbance and suicidal ideas. The patient is nervous/anxious.        Outpatient Medications Marked as Taking for the 1/10/20 encounter (Office Visit) with Milagro Wallis APRN   Medication Sig Dispense Refill   • Etonogestrel (NEXPLANON) 68 MG implant subdermal implant Inject 1 each into the appropriate area of the skin as directed by provider 1 (One) Time.     • hydrOXYzine pamoate (VISTARIL) 25 MG capsule      • QUEtiapine (SEROquel) 50 MG tablet      • sertraline (ZOLOFT) 25 MG tablet      • [DISCONTINUED] sertraline (ZOLOFT) 50 MG tablet Take 1 tablet by mouth Daily. 30 tablet 1           Objective   Physical Exam   Constitutional: She is oriented to person, place, and time. She appears well-developed and well-nourished.   HENT:   Head: Normocephalic and atraumatic.   Eyes: Pupils are equal, round, and reactive to light. Conjunctivae are normal.   Neck: Normal range of motion. No thyroid mass and no thyromegaly present.   Cardiovascular: Normal rate, regular rhythm and normal heart sounds.   Pulmonary/Chest: Effort normal and breath sounds normal.   Abdominal: Soft. Normal appearance and bowel sounds are normal. There is no tenderness.   Musculoskeletal: Normal range of motion.   Neurological: She is alert and oriented to person, place, and time.   Skin: Skin is warm and dry.   Psychiatric: She has a normal mood and affect. Her speech is normal and behavior is normal. Judgment and thought content normal. Her mood appears not anxious. Her affect is not angry and not inappropriate. Cognition and memory are normal. She does not exhibit a depressed mood. She is attentive.   Nursing note and vitals reviewed.      Vitals:    01/10/20 1059   BP: 106/70   Pulse: 62   Resp: 16   Temp: 98.4 °F (36.9 °C)   SpO2: 99%   Weight: 72.1 kg (159 lb)   Height: 152.4 cm (60\")     Body mass index is 31.05 kg/m².        Assessment/Plan   Lizzie was seen today for anxiety and " depression.    Diagnoses and all orders for this visit:    Anxiety    Moderate major depression (CMS/HCC)         Anxiety and depression symptoms are fairly well controlled.  She does still have several days a week of symptoms but describes these as mild now.  She will continue to wean the Zoloft off.  Discussed with patient and she has appropriate understanding of the weaning process.  She can continue the Seroquel for management of her anxiety and depression through [facility].  Since she is now following with them she no letter needs to follow with me for her anxiety and depression when she chooses to do so in the future.       Return for Annual due after 7/26/2020, needs to come in after 1/26/2020 for her second hep A , Annual.  I discussed my findings and recommendations with patient.  The plan of care was  discussed with patient. They verbalized understanding and agreement.  Patient was encouraged to keep me informed of any acute changes, lack of improvement, or any new concerning symptoms.       * Please note that portions of this note were completed with a voice recognition program. Efforts were made to edit the dictation but occasionally words are erroneously transcribed.

## 2020-01-17 ENCOUNTER — OFFICE VISIT (OUTPATIENT)
Dept: OBSTETRICS AND GYNECOLOGY | Facility: CLINIC | Age: 25
End: 2020-01-17

## 2020-01-17 VITALS
BODY MASS INDEX: 31.84 KG/M2 | DIASTOLIC BLOOD PRESSURE: 70 MMHG | WEIGHT: 162.2 LBS | HEIGHT: 60 IN | SYSTOLIC BLOOD PRESSURE: 104 MMHG

## 2020-01-17 DIAGNOSIS — R87.612 LOW GRADE SQUAMOUS INTRAEPITH LESION ON CYTOLOGIC SMEAR CERVIX (LGSIL): Primary | ICD-10-CM

## 2020-01-17 PROCEDURE — 99213 OFFICE O/P EST LOW 20 MIN: CPT | Performed by: OBSTETRICS & GYNECOLOGY

## 2020-01-17 NOTE — PROGRESS NOTES
"Subjective   Chief Complaint   Patient presents with   • Gynecologic Exam     repeat pap     Lizzie Farrar is a 24 y.o. year old  presenting to be seen for a PAP in follow-up of a prior abnormal PAP and/or HPV screen. Patient had a LSIL pap. Colposcopy and ECC was performed by Dr. Perez and it was negative for dysplasia.     Current birth control method: Nexplanon.    OTHER THINGS SHE WANTS TO DISCUSS TODAY:  Nothing else     The following portions of the patient's history were reviewed and updated as appropriate:current medications and allergies.    Social History    Tobacco Use      Smoking status: Former Smoker      Smokeless tobacco: Never Used    Review of Systems  Constitutional POS: nothing reported    NEG: anorexia or night sweats   Genitourinary POS: nothing reported    NEG: dysuria or hematuria      Gastointestinal POS: nothing reported    NEG: bloating, change in bowel habits, melena or reflux symptoms   Integument POS: nothing reported    NEG: moles that are changing in size, shape, color or rashes   Breast POS: nothing reported    NEG: persistent breast lump, skin dimpling or nipple discharge        Objective   /70   Ht 152.4 cm (60\")   Wt 73.6 kg (162 lb 3.2 oz)   LMP  (LMP Unknown)   Breastfeeding No   BMI 31.68 kg/m²     General:  well developed; well nourished  no acute distress   Pelvis: Clinical staff was present for exam  External genitalia:  normal appearance of the external genitalia including Bartholin's and Porters Neck's glands.  :  urethral meatus normal;  Vaginal:  normal pink mucosa without prolapse or lesions.  Cervix:  normal appearance.     Lab Review   pap smear and ECC    Imaging   No data reviewed       Assessment   1. History of LSIL pap smear     Plan   1. Pap was done today.  If she does not receive the results of the Pap within 2 weeks  time, she was instructed to call to find out the results.  2. The importance of keeping all planned follow-up and taking all " medications as prescribed was emphasized.  3. Follow up for annual exam in 1 year    No orders of the defined types were placed in this encounter.         This note was electronically signed.    Gabbi Ramírez, DO  January 17, 2020    Note: Speech recognition transcription software may have been used to create portions of this document.  An attempt at proofreading has been made but errors in transcription could still be present.

## 2020-08-07 PROCEDURE — U0003 INFECTIOUS AGENT DETECTION BY NUCLEIC ACID (DNA OR RNA); SEVERE ACUTE RESPIRATORY SYNDROME CORONAVIRUS 2 (SARS-COV-2) (CORONAVIRUS DISEASE [COVID-19]), AMPLIFIED PROBE TECHNIQUE, MAKING USE OF HIGH THROUGHPUT TECHNOLOGIES AS DESCRIBED BY CMS-2020-01-R: HCPCS | Performed by: FAMILY MEDICINE

## 2020-08-09 ENCOUNTER — TELEPHONE (OUTPATIENT)
Dept: URGENT CARE | Facility: CLINIC | Age: 25
End: 2020-08-09

## 2020-08-09 NOTE — TELEPHONE ENCOUNTER
Result reviewed with William Connell MD. Pt called and notified of positive covid result. Pt states she is feeling about the same. Advised pt to remain in home quarantine for 14 days from onset of symptoms. CDC form, EPID form, and results faxed and emailed to health department at [email or web address] and [phone].

## 2021-03-17 ENCOUNTER — OFFICE VISIT (OUTPATIENT)
Dept: INTERNAL MEDICINE | Facility: CLINIC | Age: 26
End: 2021-03-17

## 2021-03-17 VITALS
OXYGEN SATURATION: 99 % | SYSTOLIC BLOOD PRESSURE: 108 MMHG | WEIGHT: 164 LBS | BODY MASS INDEX: 30.18 KG/M2 | RESPIRATION RATE: 16 BRPM | TEMPERATURE: 97.3 F | HEIGHT: 62 IN | HEART RATE: 74 BPM | DIASTOLIC BLOOD PRESSURE: 76 MMHG

## 2021-03-17 DIAGNOSIS — F32.1 MODERATE MAJOR DEPRESSION (HCC): ICD-10-CM

## 2021-03-17 DIAGNOSIS — F41.9 ANXIETY: Primary | ICD-10-CM

## 2021-03-17 PROCEDURE — 99214 OFFICE O/P EST MOD 30 MIN: CPT | Performed by: NURSE PRACTITIONER

## 2021-03-17 RX ORDER — HYDROXYZINE PAMOATE 25 MG/1
25 CAPSULE ORAL 3 TIMES DAILY PRN
Qty: 90 CAPSULE | Refills: 1 | Status: SHIPPED | OUTPATIENT
Start: 2021-03-17

## 2021-03-17 RX ORDER — QUETIAPINE FUMARATE 50 MG/1
50 TABLET, FILM COATED ORAL NIGHTLY
Qty: 30 TABLET | Refills: 1 | Status: SHIPPED | OUTPATIENT
Start: 2021-03-17 | End: 2021-06-15 | Stop reason: SDUPTHER

## 2021-03-17 NOTE — PROGRESS NOTES
"Chief Complaint  Anxiety (was referred to [facility]-not happy with them stopped taking the meds) and Depression    Subjective          Lizzie Farrar presents to [facility] for   History of Present Illness     Anxiety and depression -chronic.  She was on Zoloft in the past which made her feel \"crazy\" and she had severe panic attacks.  She weaned off with the Zoloft and was started on Seroquel.  She was following with [facility] but reports that she did not care for the provider there so she will no longer see them and she is no longer taking any of her medications.  She feels nervous, anxious, unable to control her worry.  She is restless, feels down hopeless, and has fleeting thoughts of death but denies any suicidal or homicidal ideations or plans.    No weapons in home. Has good support system.   BOSSMAN-7 score is a 16 in office.  See flow sheet.    PHQ-9 Depression Screening  Little interest or pleasure in doing things? 3   Feeling down, depressed, or hopeless? 3   Trouble falling or staying asleep, or sleeping too much? 2   Feeling tired or having little energy? 3   Poor appetite or overeating? 3   Feeling bad about yourself - or that you are a failure or have let yourself or your family down? 3   Trouble concentrating on things, such as reading the newspaper or watching television? 1   Moving or speaking so slowly that other people could have noticed? Or the opposite - being so fidgety or restless that you have been moving around a lot more than usual? 1   Thoughts that you would be better off dead, or of hurting yourself in some way? 2   PHQ-9 Total Score 21   If you checked off any problems, how difficult have these problems made it for you to do your work, take care of things at home, or get along with other people? Very difficult         No Known Allergies  No current outpatient medications on file prior to visit.     No current facility-administered " "medications on file prior to visit.         The following portions of the patient's history were reviewed and updated as appropriate: allergies, current medications, past family history, past medical history, past social history, past surgical history and problem list and are available for review within electronic record    Objective     Vital Signs:   /76   Pulse 74   Temp 97.3 °F (36.3 °C)   Resp 16   Ht 157.5 cm (62\")   Wt 74.4 kg (164 lb)   SpO2 99%   BMI 30.00 kg/m²         Physical Exam  Constitutional:       Appearance: Normal appearance. She is well-developed.   HENT:      Head: Normocephalic and atraumatic.   Eyes:      Conjunctiva/sclera: Conjunctivae normal.      Pupils: Pupils are equal, round, and reactive to light.   Cardiovascular:      Rate and Rhythm: Normal rate and regular rhythm.      Heart sounds: Normal heart sounds.   Pulmonary:      Effort: Pulmonary effort is normal.      Breath sounds: Normal breath sounds.   Abdominal:      General: Bowel sounds are normal.      Palpations: Abdomen is soft.      Tenderness: There is no abdominal tenderness.   Musculoskeletal:         General: Normal range of motion.      Cervical back: Normal range of motion.   Skin:     General: Skin is warm and dry.   Neurological:      Mental Status: She is alert and oriented to person, place, and time.   Psychiatric:         Attention and Perception: Attention and perception normal.         Mood and Affect: Affect normal. Mood is anxious.         Speech: Speech normal.         Behavior: Behavior normal.         Thought Content: Thought content normal. Thought content does not include homicidal or suicidal ideation. Thought content does not include homicidal or suicidal plan.         Cognition and Memory: Cognition and memory normal.         Judgment: Judgment normal.          Result Review :                         Assessment and Plan      Diagnoses and all orders for this visit:    1. Anxiety " (Primary)  Assessment & Plan:  Anxiety is recurrent and symptoms are significant currently.  We will get her started back on Seroquel and refer her to psychiatry.  Encourage stress reduction and relaxation techniques.  Will reassess in 2 weeks.    Orders:  -     Ambulatory Referral to Psychiatry  -     QUEtiapine (SEROquel) 50 MG tablet; Take 1 tablet by mouth Every Night.  Dispense: 30 tablet; Refill: 1  -     hydrOXYzine pamoate (VISTARIL) 25 MG capsule; Take 1 capsule by mouth 3 (Three) Times a Day As Needed for Anxiety.  Dispense: 90 capsule; Refill: 1    2. Moderate major depression (CMS/HCC)  Assessment & Plan:  Patient's depression is recurrent and is moderate/severe without psychosis. Their depression is currently active and the condition is worsening.  Patient given information for suicide prevention hotline . This will be reassessed in 2 weeks. F/U as described:patient was prescribed an antidepressant medicine and patient referred to Mental Health Specialist.    Orders:  -     Ambulatory Referral to Psychiatry  -     QUEtiapine (SEROquel) 50 MG tablet; Take 1 tablet by mouth Every Night.  Dispense: 30 tablet; Refill: 1  -     hydrOXYzine pamoate (VISTARIL) 25 MG capsule; Take 1 capsule by mouth 3 (Three) Times a Day As Needed for Anxiety.  Dispense: 90 capsule; Refill: 1          Follow Up     Patient was given instructions and counseling regarding her condition or for health maintenance advice. Please see specific information pulled into the AVS if appropriate.   Any new medication's adverse effects have been discussed in detail with patient  Patient was encouraged to keep me informed of any acute changes, lack of improvement, or any new concerning symptoms.    Return in about 2 weeks (around 3/31/2021) for Recheck.    * Please note that portions of this note were completed with a voice recognition program. Efforts were made to edit the dictation but occasionally words are erroneously transcribed.

## 2021-03-23 PROBLEM — F41.9 ANXIETY: Chronic | Status: ACTIVE | Noted: 2019-12-06

## 2021-03-23 PROBLEM — F32.1 MODERATE MAJOR DEPRESSION: Chronic | Status: ACTIVE | Noted: 2019-12-06

## 2021-03-23 NOTE — ASSESSMENT & PLAN NOTE
Patient's depression is recurrent and is moderate/severe without psychosis. Their depression is currently active and the condition is worsening.  Patient given information for suicide prevention hotline . This will be reassessed in 2 weeks. F/U as described:patient was prescribed an antidepressant medicine and patient referred to Mental Health Specialist.

## 2021-03-23 NOTE — ASSESSMENT & PLAN NOTE
Anxiety is recurrent and symptoms are significant currently.  We will get her started back on Seroquel and refer her to psychiatry.  Encourage stress reduction and relaxation techniques.  Will reassess in 2 weeks.

## 2021-06-15 ENCOUNTER — OFFICE VISIT (OUTPATIENT)
Dept: INTERNAL MEDICINE | Facility: CLINIC | Age: 26
End: 2021-06-15

## 2021-06-15 VITALS
DIASTOLIC BLOOD PRESSURE: 74 MMHG | HEIGHT: 62 IN | SYSTOLIC BLOOD PRESSURE: 110 MMHG | RESPIRATION RATE: 16 BRPM | OXYGEN SATURATION: 98 % | BODY MASS INDEX: 31.1 KG/M2 | TEMPERATURE: 97.8 F | HEART RATE: 74 BPM | WEIGHT: 169 LBS

## 2021-06-15 DIAGNOSIS — F41.9 ANXIETY: Chronic | ICD-10-CM

## 2021-06-15 DIAGNOSIS — F32.1 MODERATE MAJOR DEPRESSION (HCC): Chronic | ICD-10-CM

## 2021-06-15 PROCEDURE — 99214 OFFICE O/P EST MOD 30 MIN: CPT | Performed by: NURSE PRACTITIONER

## 2021-06-15 RX ORDER — BUSPIRONE HYDROCHLORIDE 7.5 MG/1
7.5 TABLET ORAL 2 TIMES DAILY
Qty: 60 TABLET | Refills: 3 | Status: SHIPPED | OUTPATIENT
Start: 2021-06-15 | End: 2021-07-27 | Stop reason: ALTCHOICE

## 2021-06-15 RX ORDER — QUETIAPINE FUMARATE 25 MG/1
25 TABLET, FILM COATED ORAL NIGHTLY
Qty: 30 TABLET | Refills: 3 | Status: SHIPPED | OUTPATIENT
Start: 2021-06-15 | End: 2022-05-18

## 2021-06-15 NOTE — PROGRESS NOTES
"  Lizzie Farrar presents to [facility] for     Chief Complaint  Depression and Anxiety    Subjective        History of Present Illness    Anxiety and depression -  -chronic.  She was on Zoloft in the past which made her feel \"crazy\" and she had severe panic attacks.  She weaned off with the Zoloft and was started on Seroquel.  She was following with [facility] but reports that she did not care for the provider there so she will no longer see them and she was no longer taking any of her medications at her last visit here.  She feels nervous, anxious, unable to control her worry.  She is restless, feels down hopeless, and has fleeting thoughts of death but denies any suicidal or homicidal ideations or plans.    No weapons in home. Has good support system.   She is on seroquel.has been taking 1/2 dose because it makes her a bit drowsy. She feels like this has helped her depression more than her anxiety.     Anxiety BOSSMAN-7  Feeling nervous, anxious or on edge: Nearly every day  Not being able to stop or control worrying: Nearly every day  Worrying too much about different things: Nearly every day  Trouble Relaxing: More than half the days  Being so restless that it is hard to sit still: Not at all  Becoming easily annoyed or irritable: More than half the days  Feeling afraid as if something awful might happen: Several days  BOSSMAN 7 Total Score: 14  If you checked any problems, how difficult have these problems made it for you to do your work, take care of things at home, or get along with other people: Very difficult     PHQ-9 Depression Screening  Little interest or pleasure in doing things? 1   Feeling down, depressed, or hopeless? 1   Trouble falling or staying asleep, or sleeping too much? 0   Feeling tired or having little energy? 1   Poor appetite or overeating? 0   Feeling bad about yourself - or that you are a failure or have let yourself or your family down? 1 " "  Trouble concentrating on things, such as reading the newspaper or watching television? 1   Moving or speaking so slowly that other people could have noticed? Or the opposite - being so fidgety or restless that you have been moving around a lot more than usual? 0   Thoughts that you would be better off dead, or of hurting yourself in some way? 0   PHQ-9 Total Score 5   If you checked off any problems, how difficult have these problems made it for you to do your work, take care of things at home, or get along with other people? Very difficult           No Known Allergies  Current Outpatient Medications on File Prior to Visit   Medication Sig Dispense Refill   • hydrOXYzine pamoate (VISTARIL) 25 MG capsule Take 1 capsule by mouth 3 (Three) Times a Day As Needed for Anxiety. 90 capsule 1   • [DISCONTINUED] QUEtiapine (SEROquel) 50 MG tablet Take 1 tablet by mouth Every Night. (Patient taking differently: Take 25 mg by mouth Every Night.) 30 tablet 1     No current facility-administered medications on file prior to visit.         The following portions of the patient's history were reviewed and updated as appropriate: allergies, current medications, past family history, past medical history, past social history, past surgical history and problem list and are available for review within electronic record    Objective     Vital Signs:   /74   Pulse 74   Temp 97.8 °F (36.6 °C)   Resp 16   Ht 157.5 cm (62\")   Wt 76.7 kg (169 lb)   SpO2 98%   BMI 30.91 kg/m²         Physical Exam  Constitutional:       Appearance: Normal appearance. She is well-developed.   HENT:      Head: Normocephalic and atraumatic.   Eyes:      Conjunctiva/sclera: Conjunctivae normal.      Pupils: Pupils are equal, round, and reactive to light.   Cardiovascular:      Rate and Rhythm: Normal rate and regular rhythm.      Heart sounds: Normal heart sounds.   Pulmonary:      Effort: Pulmonary effort is normal.      Breath sounds: Normal " breath sounds.   Abdominal:      General: Bowel sounds are normal.      Palpations: Abdomen is soft.      Tenderness: There is no abdominal tenderness.   Musculoskeletal:         General: Normal range of motion.      Cervical back: Normal range of motion.   Skin:     General: Skin is warm and dry.   Neurological:      Mental Status: She is alert and oriented to person, place, and time.   Psychiatric:         Attention and Perception: Attention and perception normal.         Mood and Affect: Affect normal. Mood is anxious.         Speech: Speech normal.         Behavior: Behavior normal.         Thought Content: Thought content normal. Thought content does not include homicidal or suicidal ideation. Thought content does not include homicidal or suicidal plan.         Cognition and Memory: Cognition and memory normal.         Judgment: Judgment normal.          Result Review :                         Assessment and Plan      Diagnoses and all orders for this visit:    1. Anxiety  -     QUEtiapine (SEROquel) 25 MG tablet; Take 1 tablet by mouth Every Night.  Dispense: 30 tablet; Refill: 3  -     busPIRone (BUSPAR) 7.5 MG tablet; Take 1 tablet by mouth 2 (two) times a day.  Dispense: 60 tablet; Refill: 3  -     CBC (No Diff); Future  -     Comprehensive Metabolic Panel; Future  -     TSH Rfx On Abnormal To Free T4; Future  Anxiety is uncontrolled.  We will continue Seroquel and add BuSpar.  Adverse effects.  Continue following with counselor.  2. Moderate major depression (CMS/HCC)  -     QUEtiapine (SEROquel) 25 MG tablet; Take 1 tablet by mouth Every Night.  Dispense: 30 tablet; Refill: 3  -     CBC (No Diff); Future  -     Comprehensive Metabolic Panel; Future  -     TSH Rfx On Abnormal To Free T4; Future  Depression is moderate, recurrent, and currently active but improving.  We will continue Seroquel but decrease to 25 mg.  She should also go ahead and schedule follow-up with psychiatry and continue counseling.              Follow Up     Patient was given instructions and counseling regarding her condition or for health maintenance advice. Please see specific information pulled into the AVS if appropriate.   Any new medication's adverse effects have been discussed in detail with patient  Patient was encouraged to keep me informed of any acute changes, lack of improvement, or any new concerning symptoms.    Return in about 6 weeks (around 7/27/2021) for Recheck.    * Please note that portions of this note were completed with a voice recognition program. Efforts were made to edit the dictation but occasionally words are erroneously transcribed.

## 2021-06-17 ENCOUNTER — LAB (OUTPATIENT)
Dept: LAB | Facility: HOSPITAL | Age: 26
End: 2021-06-17

## 2021-06-17 DIAGNOSIS — F41.9 ANXIETY: Chronic | ICD-10-CM

## 2021-06-17 DIAGNOSIS — F32.1 MODERATE MAJOR DEPRESSION (HCC): Chronic | ICD-10-CM

## 2021-06-17 LAB
ALBUMIN SERPL-MCNC: 4.2 G/DL (ref 3.5–5.2)
ALBUMIN/GLOB SERPL: 1.4 G/DL
ALP SERPL-CCNC: 69 U/L (ref 39–117)
ALT SERPL W P-5'-P-CCNC: 25 U/L (ref 1–33)
ANION GAP SERPL CALCULATED.3IONS-SCNC: 9.8 MMOL/L (ref 5–15)
AST SERPL-CCNC: 27 U/L (ref 1–32)
BILIRUB SERPL-MCNC: 0.2 MG/DL (ref 0–1.2)
BUN SERPL-MCNC: 10 MG/DL (ref 6–20)
BUN/CREAT SERPL: 13.7 (ref 7–25)
CALCIUM SPEC-SCNC: 9.2 MG/DL (ref 8.6–10.5)
CHLORIDE SERPL-SCNC: 102 MMOL/L (ref 98–107)
CO2 SERPL-SCNC: 25.2 MMOL/L (ref 22–29)
CREAT SERPL-MCNC: 0.73 MG/DL (ref 0.57–1)
DEPRECATED RDW RBC AUTO: 40 FL (ref 37–54)
ERYTHROCYTE [DISTWIDTH] IN BLOOD BY AUTOMATED COUNT: 12.3 % (ref 12.3–15.4)
GFR SERPL CREATININE-BSD FRML MDRD: 117 ML/MIN/1.73
GFR SERPL CREATININE-BSD FRML MDRD: 96 ML/MIN/1.73
GLOBULIN UR ELPH-MCNC: 3 GM/DL
GLUCOSE SERPL-MCNC: 68 MG/DL (ref 65–99)
HCT VFR BLD AUTO: 43.6 % (ref 34–46.6)
HGB BLD-MCNC: 14.5 G/DL (ref 12–15.9)
MCH RBC QN AUTO: 29.7 PG (ref 26.6–33)
MCHC RBC AUTO-ENTMCNC: 33.3 G/DL (ref 31.5–35.7)
MCV RBC AUTO: 89.2 FL (ref 79–97)
PLATELET # BLD AUTO: 289 10*3/MM3 (ref 140–450)
PMV BLD AUTO: 10.2 FL (ref 6–12)
POTASSIUM SERPL-SCNC: 3.2 MMOL/L (ref 3.5–5.2)
PROT SERPL-MCNC: 7.2 G/DL (ref 6–8.5)
RBC # BLD AUTO: 4.89 10*6/MM3 (ref 3.77–5.28)
SODIUM SERPL-SCNC: 137 MMOL/L (ref 136–145)
WBC # BLD AUTO: 9.3 10*3/MM3 (ref 3.4–10.8)

## 2021-06-17 PROCEDURE — 85027 COMPLETE CBC AUTOMATED: CPT

## 2021-06-17 PROCEDURE — 84443 ASSAY THYROID STIM HORMONE: CPT

## 2021-06-17 PROCEDURE — 80053 COMPREHEN METABOLIC PANEL: CPT

## 2021-06-18 LAB — TSH SERPL DL<=0.05 MIU/L-ACNC: 1.42 UIU/ML (ref 0.27–4.2)

## 2021-06-21 DIAGNOSIS — E87.6 HYPOKALEMIA: Primary | ICD-10-CM

## 2021-07-27 ENCOUNTER — OFFICE VISIT (OUTPATIENT)
Dept: INTERNAL MEDICINE | Facility: CLINIC | Age: 26
End: 2021-07-27

## 2021-07-27 ENCOUNTER — LAB (OUTPATIENT)
Dept: LAB | Facility: HOSPITAL | Age: 26
End: 2021-07-27

## 2021-07-27 VITALS
HEART RATE: 66 BPM | WEIGHT: 166.6 LBS | OXYGEN SATURATION: 99 % | DIASTOLIC BLOOD PRESSURE: 62 MMHG | HEIGHT: 62 IN | TEMPERATURE: 97.7 F | BODY MASS INDEX: 30.66 KG/M2 | SYSTOLIC BLOOD PRESSURE: 112 MMHG

## 2021-07-27 DIAGNOSIS — Z30.011 ENCOUNTER FOR INITIAL PRESCRIPTION OF CONTRACEPTIVE PILLS: Primary | ICD-10-CM

## 2021-07-27 DIAGNOSIS — F41.9 ANXIETY: ICD-10-CM

## 2021-07-27 DIAGNOSIS — E87.6 HYPOKALEMIA: ICD-10-CM

## 2021-07-27 DIAGNOSIS — F32.1 MODERATE MAJOR DEPRESSION (HCC): ICD-10-CM

## 2021-07-27 DIAGNOSIS — F32.81 PMDD (PREMENSTRUAL DYSPHORIC DISORDER): ICD-10-CM

## 2021-07-27 PROBLEM — Z30.017 NEXPLANON INSERTION: Status: RESOLVED | Noted: 2019-10-25 | Resolved: 2021-07-27

## 2021-07-27 LAB — POTASSIUM SERPL-SCNC: 4.8 MMOL/L (ref 3.5–5.2)

## 2021-07-27 PROCEDURE — 99214 OFFICE O/P EST MOD 30 MIN: CPT | Performed by: NURSE PRACTITIONER

## 2021-07-27 PROCEDURE — 84132 ASSAY OF SERUM POTASSIUM: CPT | Performed by: NURSE PRACTITIONER

## 2021-07-27 RX ORDER — ARIPIPRAZOLE 5 MG/1
5 TABLET ORAL DAILY
COMMUNITY
Start: 2021-07-12

## 2021-07-27 RX ORDER — LEVONORGESTREL / ETHINYL ESTRADIOL AND ETHINYL ESTRADIOL 150-30(84)
1 KIT ORAL DAILY
Qty: 91 EACH | Refills: 3 | Status: SHIPPED | OUTPATIENT
Start: 2021-07-27 | End: 2022-05-18

## 2021-07-27 NOTE — PROGRESS NOTES
"  Lizzie Farrar presents to [facility] for     Chief Complaint  Anxiety (follow up), Depression, and hormones (wants to discuss)    Subjective        History of Present Illness    Anxiety and depression-   Now seeing psych at [facility].(stephania lee)  Stopped buspar, started abilify.   Symptoms are much better.       She has noticed that she has an increase in anxiety and \"crazy episodes\" right before her menses.  She notes some irritability and restlessness during this time.  nexplanon removed 2/2021. She is now starting to have her menses regularly and has notice mood changes the week before her menses.   This is better for the last menses.    She is currently on her menses.  She has not been sexually active in a number of months and denies any possibility of pregnancy.  She has no history of migraine with aura, breast cancer, thromboembolic disease, cardiac or cerebrovascular disease, or uncontrolled hypertension.        No Known Allergies  Current Outpatient Medications on File Prior to Visit   Medication Sig Dispense Refill   • hydrOXYzine pamoate (VISTARIL) 25 MG capsule Take 1 capsule by mouth 3 (Three) Times a Day As Needed for Anxiety. 90 capsule 1   • QUEtiapine (SEROquel) 25 MG tablet Take 1 tablet by mouth Every Night. 30 tablet 3   • ARIPiprazole (ABILIFY) 5 MG tablet 5 mg Daily.     • [DISCONTINUED] busPIRone (BUSPAR) 7.5 MG tablet Take 1 tablet by mouth 2 (two) times a day. 60 tablet 3     No current facility-administered medications on file prior to visit.         The following portions of the patient's history were reviewed and updated as appropriate: allergies, current medications, past family history, past medical history, past social history, past surgical history and problem list and are available for review within electronic record    Objective     Vital Signs:   /62 (BP Location: Right arm, Patient Position: Sitting)   Pulse 66   Temp 97.7 °F (36.5 °C) " "(Infrared)   Ht 157.5 cm (62\")   Wt 75.6 kg (166 lb 9.6 oz)   SpO2 99%   BMI 30.47 kg/m²         Physical Exam  Constitutional:       Appearance: Normal appearance. She is well-developed.   HENT:      Head: Normocephalic and atraumatic.   Eyes:      Conjunctiva/sclera: Conjunctivae normal.      Pupils: Pupils are equal, round, and reactive to light.   Cardiovascular:      Rate and Rhythm: Normal rate and regular rhythm.      Heart sounds: Normal heart sounds.   Pulmonary:      Effort: Pulmonary effort is normal.      Breath sounds: Normal breath sounds.   Abdominal:      General: Bowel sounds are normal.      Palpations: Abdomen is soft.      Tenderness: There is no abdominal tenderness.   Musculoskeletal:         General: Normal range of motion.      Cervical back: Normal range of motion.   Skin:     General: Skin is warm and dry.   Neurological:      Mental Status: She is alert and oriented to person, place, and time.   Psychiatric:         Attention and Perception: Attention and perception normal.         Mood and Affect: Affect normal. Mood is anxious.         Speech: Speech normal.         Behavior: Behavior normal.         Thought Content: Thought content normal. Thought content does not include homicidal or suicidal ideation. Thought content does not include homicidal or suicidal plan.         Cognition and Memory: Cognition and memory normal.         Judgment: Judgment normal.          Result Review :                         Assessment and Plan      Diagnoses and all orders for this visit:    1. Encounter for initial prescription of contraceptive pills (Primary)  -     Levonorgest-Eth Estrad 91-Day 0.15-0.03 &0.01 MG tablet; Take 1 tablet by mouth Daily.  Dispense: 91 each; Refill: 3  She will go ahead and get her contraception started this Sunday.  Discussed what to do for missed pills.  2. PMDD (premenstrual dysphoric disorder)  -     Levonorgest-Eth Estrad 91-Day 0.15-0.03 &0.01 MG tablet; Take 1 " tablet by mouth Daily.  Dispense: 91 each; Refill: 3  We will go ahead and start her on contraception to minimize some of her mood fluctuations related to her menses.  3. Anxiety  4. Moderate major depression (CMS/HCC)  #3/4-anxiety and depression are significantly improved.  She will continue to follow with psychiatry who will manage her anxiety and depression from here on out.  Agree with Abilify.          Follow Up     Patient was given instructions and counseling regarding her condition or for health maintenance advice. Please see specific information pulled into the AVS if appropriate.   Any new medication's adverse effects have been discussed in detail with patient  Patient was encouraged to keep me informed of any acute changes, lack of improvement, or any new concerning symptoms.    Return in about 3 months (around 10/27/2021) for Annual, collect labs today from previous encounter.    * Please note that portions of this note were completed with a voice recognition program. Efforts were made to edit the dictation but occasionally words are erroneously transcribed.

## 2022-05-18 ENCOUNTER — LAB (OUTPATIENT)
Dept: LAB | Facility: HOSPITAL | Age: 27
End: 2022-05-18

## 2022-05-18 ENCOUNTER — OFFICE VISIT (OUTPATIENT)
Dept: INTERNAL MEDICINE | Facility: CLINIC | Age: 27
End: 2022-05-18

## 2022-05-18 VITALS
TEMPERATURE: 98.1 F | OXYGEN SATURATION: 99 % | HEART RATE: 61 BPM | WEIGHT: 167.4 LBS | SYSTOLIC BLOOD PRESSURE: 116 MMHG | DIASTOLIC BLOOD PRESSURE: 64 MMHG | RESPIRATION RATE: 18 BRPM | HEIGHT: 61 IN | BODY MASS INDEX: 31.6 KG/M2

## 2022-05-18 DIAGNOSIS — Z00.00 ANNUAL PHYSICAL EXAM: Primary | ICD-10-CM

## 2022-05-18 DIAGNOSIS — Z28.39 HEPATITIS A VACCINATION NOT UP TO DATE: ICD-10-CM

## 2022-05-18 DIAGNOSIS — F32.1 MODERATE MAJOR DEPRESSION: ICD-10-CM

## 2022-05-18 DIAGNOSIS — E66.9 OBESITY (BMI 30.0-34.9): ICD-10-CM

## 2022-05-18 DIAGNOSIS — Z00.00 ANNUAL PHYSICAL EXAM: ICD-10-CM

## 2022-05-18 DIAGNOSIS — F41.9 ANXIETY: ICD-10-CM

## 2022-05-18 LAB
ALBUMIN SERPL-MCNC: 4.3 G/DL (ref 3.5–5.2)
ALBUMIN/GLOB SERPL: 1.7 G/DL
ALP SERPL-CCNC: 64 U/L (ref 39–117)
ALT SERPL W P-5'-P-CCNC: 21 U/L (ref 1–33)
ANION GAP SERPL CALCULATED.3IONS-SCNC: 10 MMOL/L (ref 5–15)
AST SERPL-CCNC: 22 U/L (ref 1–32)
BILIRUB SERPL-MCNC: 0.2 MG/DL (ref 0–1.2)
BUN SERPL-MCNC: 11 MG/DL (ref 6–20)
BUN/CREAT SERPL: 14.9 (ref 7–25)
CALCIUM SPEC-SCNC: 8.7 MG/DL (ref 8.6–10.5)
CHLORIDE SERPL-SCNC: 106 MMOL/L (ref 98–107)
CHOLEST SERPL-MCNC: 219 MG/DL (ref 0–200)
CO2 SERPL-SCNC: 23 MMOL/L (ref 22–29)
CREAT SERPL-MCNC: 0.74 MG/DL (ref 0.57–1)
DEPRECATED RDW RBC AUTO: 38.1 FL (ref 37–54)
EGFRCR SERPLBLD CKD-EPI 2021: 113.9 ML/MIN/1.73
ERYTHROCYTE [DISTWIDTH] IN BLOOD BY AUTOMATED COUNT: 12.5 % (ref 12.3–15.4)
GLOBULIN UR ELPH-MCNC: 2.6 GM/DL
GLUCOSE SERPL-MCNC: 97 MG/DL (ref 65–99)
HCT VFR BLD AUTO: 40.4 % (ref 34–46.6)
HDLC SERPL-MCNC: 51 MG/DL (ref 40–60)
HGB BLD-MCNC: 14 G/DL (ref 12–15.9)
LDLC SERPL CALC-MCNC: 149 MG/DL (ref 0–100)
LDLC/HDLC SERPL: 2.87 {RATIO}
MCH RBC QN AUTO: 29.9 PG (ref 26.6–33)
MCHC RBC AUTO-ENTMCNC: 34.7 G/DL (ref 31.5–35.7)
MCV RBC AUTO: 86.1 FL (ref 79–97)
PLATELET # BLD AUTO: 269 10*3/MM3 (ref 140–450)
PMV BLD AUTO: 10.4 FL (ref 6–12)
POTASSIUM SERPL-SCNC: 3.9 MMOL/L (ref 3.5–5.2)
PROT SERPL-MCNC: 6.9 G/DL (ref 6–8.5)
RBC # BLD AUTO: 4.69 10*6/MM3 (ref 3.77–5.28)
SODIUM SERPL-SCNC: 139 MMOL/L (ref 136–145)
TRIGL SERPL-MCNC: 107 MG/DL (ref 0–150)
VLDLC SERPL-MCNC: 19 MG/DL (ref 5–40)
WBC NRBC COR # BLD: 6.49 10*3/MM3 (ref 3.4–10.8)

## 2022-05-18 PROCEDURE — 90632 HEPA VACCINE ADULT IM: CPT | Performed by: NURSE PRACTITIONER

## 2022-05-18 PROCEDURE — 90471 IMMUNIZATION ADMIN: CPT | Performed by: NURSE PRACTITIONER

## 2022-05-18 PROCEDURE — 80061 LIPID PANEL: CPT | Performed by: NURSE PRACTITIONER

## 2022-05-18 PROCEDURE — 99395 PREV VISIT EST AGE 18-39: CPT | Performed by: NURSE PRACTITIONER

## 2022-05-18 PROCEDURE — 80053 COMPREHEN METABOLIC PANEL: CPT | Performed by: NURSE PRACTITIONER

## 2022-05-18 PROCEDURE — 85027 COMPLETE CBC AUTOMATED: CPT | Performed by: NURSE PRACTITIONER

## 2022-05-18 NOTE — PROGRESS NOTES
Patient Care Team:  Milagro Wallis APRN as PCP - General (Nurse Practitioner)  Gabbi Ramírez DO (Inactive) as Consulting Physician (Obstetrics and Gynecology)     Chief complaint: Patient is in today for a physical          Patient is a 27 y.o. female who presents for her yearly physical exam.     HPI      No acute complaints     anx and dep-  Well controlled. Follows with psych. On Abilify and hydroxyzine.     Health maintenance/lifestyle:  Health Maintenance   Topic Date Due   • ANNUAL PHYSICAL  07/27/2020   • PAP SMEAR  01/17/2021   • INFLUENZA VACCINE  08/01/2022   • TDAP/TD VACCINES (2 - Td or Tdap) 07/26/2029   • HEPATITIS C SCREENING  Completed   • COVID-19 Vaccine  Completed   • HEPATITIS A VACCINE ADULT  Completed   • Pneumococcal Vaccine 0-64  Aged Out       Immunization History   Administered Date(s) Administered   • COVID-19 (MODERNA) 1st, 2nd, 3rd Dose Only 04/24/2021   • COVID-19 (PFIZER) PURPLE CAP 02/19/2021, 03/17/2021   • Hepatitis A 07/26/2019, 05/18/2022   • Hpv9 05/01/2021   • Influenza, Unspecified 10/30/2021   • Tdap 07/26/2019       Covid vaccine: Up-to-date  Influenza: Due fall 2020 today  Tetanus: Due 2029  Hep A: Has had 1 dose, will update second dose today.  Hep C screening: Denies high risk behaviors.  Negative screening in 2019      Cancer-related family history includes Pancreatic cancer in her maternal uncle. There is no history of Breast cancer, Cervical cancer, Lung cancer, Ovarian cancer, or Colon cancer.    Colon cancer screening: start at age 45     Mammogram: will start at age 40  Does SBE- denies concerning areas     reports being sexually active and has had partner(s) who are male. She reports using the following methods of birth control/protection: Condom and Implant.  STI concerns: denies  Menses:    Patient's last menstrual period was 05/11/2022 (approximate).  Pap: due in 2023. Per GYN    Eye exam: due, advised to schedule- wears glasses  Dental exam: has appt  on monday    Sunscreen use: wears  Seatbelt use: wears      Exercise: walks dog every day for 15 minutes.   Diet: unhealthy, lots of fast food.   Caffeine: once or twice a week  Calcium intsake inadequate-  advised to start calcium and MVI     Social History     Tobacco Use   Smoking Status Never Smoker   Smokeless Tobacco Never Used     Social History     Substance and Sexual Activity   Alcohol Use Yes       PHQ-2 Depression Screening  Little interest or pleasure in doing things? 0-->not at all   Feeling down, depressed, or hopeless? 1-->several days   PHQ-2 Total Score 1       Review of Systems   Constitutional: Negative for activity change, appetite change, chills, diaphoresis, fatigue, fever, unexpected weight gain and unexpected weight loss.   HENT: Negative for voice change.         Decreased sense of taste and smell since covid.      Eyes: Negative for blurred vision, double vision, pain and visual disturbance.   Respiratory: Negative for cough, chest tightness, shortness of breath and wheezing.    Cardiovascular: Negative for chest pain, palpitations and leg swelling.   Gastrointestinal: Negative for abdominal distention, abdominal pain, constipation, diarrhea, nausea and vomiting.   Endocrine: Negative for cold intolerance, heat intolerance, polydipsia, polyphagia and polyuria.   Genitourinary: Negative for difficulty urinating, frequency and urgency.   Musculoskeletal: Negative for arthralgias and myalgias.   Skin: Negative for color change, dry skin and rash.   Neurological: Negative for dizziness, facial asymmetry, weakness, light-headedness, numbness, headache and confusion.   Hematological: Negative for adenopathy. Does not bruise/bleed easily.   Psychiatric/Behavioral: Negative for decreased concentration and sleep disturbance. The patient is not nervous/anxious.          History  Social History     Socioeconomic History   • Marital status: Single   • Number of children: 0   • Highest education level:  "Some college, no degree   Tobacco Use   • Smoking status: Never Smoker   • Smokeless tobacco: Never Used   Substance and Sexual Activity   • Alcohol use: Yes   • Drug use: No   • Sexual activity: Yes     Partners: Male     Birth control/protection: Condom     Past Medical History:   Diagnosis Date   • Abnormal Pap smear of cervix 07/26/2019    LSIL   • Anxiety    • COVID 2020 2020 and 2022   • Depression    • Nexplanon in placed on 10/25/2019 10/25/2019    Removed 2/2021   • Strep throat       Past Surgical History:   Procedure Laterality Date   • TONSILLECTOMY  2019      No Known Allergies   Family History   Problem Relation Age of Onset   • Hyperlipidemia Mother    • No Known Problems Father    • Cirrhosis Maternal Uncle         alcohol   • Pancreatic cancer Maternal Uncle    • Hypertension Maternal Grandmother    • Stroke Neg Hx    • Heart attack Neg Hx    • Breast cancer Neg Hx    • Cervical cancer Neg Hx    • Lung cancer Neg Hx    • Ovarian cancer Neg Hx    • Colon cancer Neg Hx        Current Outpatient Medications:   •  ARIPiprazole (ABILIFY) 5 MG tablet, 5 mg Daily., Disp: , Rfl:   •  hydrOXYzine pamoate (VISTARIL) 25 MG capsule, Take 1 capsule by mouth 3 (Three) Times a Day As Needed for Anxiety., Disp: 90 capsule, Rfl: 1                  /64   Pulse 61   Temp 98.1 °F (36.7 °C) (Infrared)   Resp 18   Ht 155.6 cm (61.25\")   Wt 75.9 kg (167 lb 6.4 oz)   LMP 05/11/2022 (Approximate)   SpO2 99%   Breastfeeding No   BMI 31.37 kg/m²       Physical Exam  Vitals and nursing note reviewed.   Constitutional:       General: She is not in acute distress.     Appearance: Normal appearance. She is well-developed. She is not diaphoretic.   HENT:      Head: Normocephalic and atraumatic.      Right Ear: External ear normal.      Left Ear: External ear normal.      Mouth/Throat:      Pharynx: No oropharyngeal exudate.   Eyes:      General: No scleral icterus.        Right eye: No discharge.         Left " eye: No discharge.      Conjunctiva/sclera: Conjunctivae normal.   Neck:      Thyroid: No thyromegaly.      Vascular: No carotid bruit or JVD.      Trachea: No tracheal deviation.   Cardiovascular:      Rate and Rhythm: Normal rate and regular rhythm.      Heart sounds: Normal heart sounds. No murmur heard.    No friction rub.   Pulmonary:      Effort: Pulmonary effort is normal. No respiratory distress.      Breath sounds: Normal breath sounds. No wheezing or rales.   Chest:      Chest wall: No tenderness.   Abdominal:      General: Bowel sounds are normal. There is no distension.      Palpations: Abdomen is soft. There is no mass.      Tenderness: There is no abdominal tenderness.      Hernia: No hernia is present.   Musculoskeletal:         General: No tenderness or deformity. Normal range of motion.      Cervical back: Normal range of motion and neck supple.   Lymphadenopathy:      Cervical: No cervical adenopathy.   Skin:     General: Skin is warm and dry.      Coloration: Skin is not pale.      Findings: No erythema or rash.   Neurological:      Mental Status: She is alert and oriented to person, place, and time.      Deep Tendon Reflexes: Reflexes are normal and symmetric. Reflexes normal.   Psychiatric:         Behavior: Behavior normal.         Thought Content: Thought content normal.         Judgment: Judgment normal.                   Diagnoses and all orders for this visit:    1. Annual physical exam (Primary)  -     CBC (No Diff); Future  -     Comprehensive Metabolic Panel; Future  -     Lipid Panel; Future    2. Hepatitis A vaccination not up to date  -     Hepatitis A Vaccine Adult IM    3. Moderate major depression (HCC)  #3/4-well-controlled.  No evidence of psychosis.  Continue to follow with psychiatry.  Agree with Abilify.  4. Anxiety  5.  Obesity (BMI 30.0-34.9)  BMI is >= 30 and <= 34.9 (Class 1 obesity). The following options were offered after discussion: exercise counseling/recommendations  and nutrition counseling/recommendations         Labs  ordered as above- will notify of results and treat accordingly. If patient has not received results within one week via mychart or letter, they will notify my office  Immunizations and screenings:  Other preventative/screenings are up-to-date/addressed as noted in the HPI.  Counseling: The patient is advised to begin progressive daily aerobic exercise program, follow a low fat, low cholesterol diet, attempt to lose weight, continue current medications and return for routine annual checkups  Health Maintenance reviewed.    Follow up: Return in about 1 year (around 5/18/2023) for Annual, and need to collect labs today.  Plan of care discussed with pt. They verbalized understanding and agreement.     Electronically signed by : YEE Saavedra   5/18/2022   08:52 EDT              Dictated Utilizing Dragon Dictation   Please note that portions of this note were completed with a voice recognition program.   Part of this note may be an electronic transcription/translation of spoken language to printed text using the Dragon Dictation System.

## 2022-05-25 ENCOUNTER — TELEPHONE (OUTPATIENT)
Dept: INTERNAL MEDICINE | Facility: CLINIC | Age: 27
End: 2022-05-25

## 2023-03-06 ENCOUNTER — OFFICE VISIT (OUTPATIENT)
Dept: INTERNAL MEDICINE | Facility: CLINIC | Age: 28
End: 2023-03-06
Payer: COMMERCIAL

## 2023-03-06 VITALS
WEIGHT: 183.6 LBS | DIASTOLIC BLOOD PRESSURE: 68 MMHG | HEART RATE: 76 BPM | SYSTOLIC BLOOD PRESSURE: 106 MMHG | OXYGEN SATURATION: 99 % | TEMPERATURE: 97.3 F | RESPIRATION RATE: 18 BRPM | BODY MASS INDEX: 34.66 KG/M2 | HEIGHT: 61 IN

## 2023-03-06 DIAGNOSIS — F32.1 MODERATE MAJOR DEPRESSION: ICD-10-CM

## 2023-03-06 DIAGNOSIS — F41.9 ANXIETY: Primary | ICD-10-CM

## 2023-03-06 PROCEDURE — 99214 OFFICE O/P EST MOD 30 MIN: CPT | Performed by: NURSE PRACTITIONER

## 2023-03-06 RX ORDER — ESCITALOPRAM OXALATE 10 MG/1
TABLET ORAL
Qty: 30 TABLET | Refills: 1 | Status: SHIPPED | OUTPATIENT
Start: 2023-03-06

## 2023-03-06 NOTE — PATIENT INSTRUCTIONS
Suicidal Feelings: How to Help Yourself  Suicide is when you end your own life. Suicidal ideation includes expressing thoughts about, or a preoccupation with, ending your own life. There are many things you can do to help yourself feel better when struggling with these feelings. Many services and people are available to support you and others who struggle with similar feelings.  If you ever feel like you may hurt yourself or others, or have thoughts about taking your own life, get help right away. To get help:  Go to your nearest emergency department.  Call your local emergency services (341 in the [place]  Call the [facility] services helpline (211 in the [place]  Call or text a suicide hotline to speak with a trained counselor. The following suicide hotlines are available in the [place]:  2-709-044-TALK (1-936.930.7107 or 572 in the [place]  2-835-CEXSTGL (1-688.362.1387).  Text 914124. This is the Crisis Text Line in the [place]  1-900.673.3046. This is a hotline for [place] speakers.  1-697.845.8366. This is a hotline for TTY users.  3-542-2-U-WOLF (1-607.809.8119). This is a hotline for lesbian, medina, bisexual, transgender, or questioning youth.  For a list of hotlines in [place], visit suicide.org/hotlines/international/hyocln-menzacd-juncmxvr.html  Contact a crisis center or a local suicide prevention center. To find a crisis center or suicide prevention center:  Call your local hospital, clinic, community service organization, mental health center, social service provider, or health department. Ask for help with connecting to a crisis center.  For a list of crisis centers in the [place], visit: suicidepreventionlifeline.org  For a list of crisis centers in [place], visit: suicideprevention.ca  How to help yourself feel better    Promise yourself that you will not do anything bad or extreme when you have suicidal feelings. Remember the times you have felt hopeful.  Many people have  gotten through suicidal thoughts and feelings, and you can too.  If you have had these feelings before, remind yourself that you can get through them again.  Let family, friends, teachers, or counselors know how you are feeling. Do not separate yourself from those who care about you and want to help you.  Talk with someone every day, even if you do not feel like talking to anyone or being with other people.  Face-to-face conversation is best to help them understand your feelings.  Contact a mental health care provider and work with this person regularly.  Make a safety plan that you can follow during a crisis.  Include phone numbers of suicide prevention hotlines, mental health professionals, and trusted friends and family members you can call during an emergency.  Save these numbers on your phone.  If you are thinking of taking a lot of medicine, give your medicine to someone who can give it to you as prescribed.  If you are on antidepressants and are concerned you will overdose, tell your health care provider so that he or she can give you safer medicines.  Try to stick to your routines and follow a schedule every day. Make self-care a priority.  Make a list of realistic goals, and cross them off when you achieve them. Accomplishments can give you a sense of worth.  Wait until you are feeling better before doing things that you find difficult or unpleasant.  Do things that you have always enjoyed to take your mind off your feelings.  Try reading a book, or listening to or playing music.  Spending time outside, in nature, may help you feel better.  Follow these instructions at home:    Visit your primary health care provider every year for a physical and a mental health checkup.  Take over-the-counter and prescription medicines only as told by your health care provider.  Ask your health care provider about the possible side effects of any medicines you are taking.  Ask your health care provider about whether  suicidal ideation is a possible side effect of any of your medicines.  Learn about suicidal ideation and what increases the risk for the development of suicidal thoughts.  Eat a well-balanced diet, and eat regular meals.  Get plenty of rest.  Exercise if you are able. Just 30 minutes of exercise each day can help you feel better.  Keep your living space well lit.  Do not use alcohol or drugs. Remove these substances from your home.  General recommendations  Remove weapons, poisons, knives, and other deadly items from your home.  Work with a mental health care provider as needed.  When you are feeling well, write yourself a letter with tips and support that you can read when you are not feeling well.  Remember that life's difficulties can be sorted out with help. Conditions can be treated, and you can learn behaviors and ways of thinking that will help you.  Work with your health care provider or counselor to learn ways of coping with your thoughts and feelings.  Where to find more information  National Suicide Prevention Lifeline: www.suicidepreventionlifeline.org  Hopeline: www.hopeline.[employer]  American Foundation for Suicide Prevention: www.afsp.org  The [place] Project (for lesbian, medina, bisexual, transgender, or questioning youth): www.thetrevorproject.org  National [place] of Mental Health: www.nimh.nih.gov/health/topics/suicide-prevention  Suicide Prevention Resources: afsp.org/suicide-prevention-resources  Contact a health care provider if:  You feel as though you are a burden to others.  You feel agitated, angry, vengeful, or have extreme mood swings.  You have withdrawn from family and friends.  You are frequently using drugs or alcohol.  Get help right away if:  You are talking about suicide or wishing to die.  You start making plans for how to commit suicide.  You feel that you have no reason to live.  You start making plans for putting your affairs in order, saying goodbye, or giving your possessions  away.  You feel guilt, shame, or unbearable pain, and it seems like there is no way out.  You are engaging in risky behaviors that could lead to death.  If you have any of these thoughts or symptoms, get help right away:  Go to your nearest emergency department or crisis center.  Call emergency services (911 in the [place]  Call or text a suicide crisis helpline.  Summary  Suicide is when you take your own life. Suicidal feelings are thoughts about ending your own life.  Promise yourself that you will not do anything bad or extreme when you have suicidal feelings.  Let family, friends, teachers, or counselors know how you are feeling.  Get help right away if you start making plans for how to commit suicide.  This information is not intended to replace advice given to you by your health care provider. Make sure you discuss any questions you have with your health care provider.  Document Revised: 07/14/2022 Document Reviewed: 04/28/2022  [employer] Patient Education © 2022 [employer]      Major Depressive Disorder, Adult  Major depressive disorder (MDD) is a mental health condition. It may also be called clinical depression or unipolar depression. MDD causes symptoms of sadness, hopelessness, and loss of interest in things. These symptoms last most of the day, almost every day, for 2 weeks. MDD can also cause physical symptoms. It can interfere with relationships and with everyday activities, such as work, school, and activities that are usually pleasant.  MDD may be mild, moderate, or severe. It may be single-episode MDD, which happens once, or recurrent MDD, which may occur multiple times.  What are the causes?  The exact cause of this condition is not known. MDD is most likely caused by a combination of things, which may include:  Your personality traits.  [place] or conditioned behaviors or thoughts or feelings that reinforce negativity.  Any alcohol or substance misuse.  Long-term (chronic) physical or mental  health illness.  Going through a traumatic experience or major life changes.  What increases the risk?  The following factors may make someone more likely to develop MDD:  A family history of depression.  Being a woman.  Troubled family relationships.  Abnormally low levels of certain brain chemicals.  Traumatic or painful events in childhood, especially abuse or loss of a parent.  A lot of stress from life experiences, such as poor living conditions or discrimination.  Chronic physical illness or other mental health disorders.  What are the signs or symptoms?  The main symptoms of MDD usually include:  Constant depressed or irritable mood.  A loss of interest in things and activities.  Other symptoms include:  Sleeping or eating too much or too little.  Unexplained weight gain or weight loss.  Tiredness or low energy.  Being agitated, restless, or weak.  Feeling hopeless, worthless, or guilty.  Trouble thinking clearly or making decisions.  Thoughts of suicide or thoughts of harming others.  Isolating oneself or avoiding other people or activities.  Trouble completing tasks, work, or any normal obligations.  Severe symptoms of this condition may include:  Psychotic depression.This may include false beliefs, or delusions. It may also include seeing, hearing, tasting, smelling, or feeling things that are not real (hallucinations).  Chronic depression or persistent depressive disorder. This is low-level depression that lasts for at least 2 years.  Melancholic depression, or feeling extremely sad and hopeless.  Catatonic depression, which includes trouble speaking and trouble moving.  How is this diagnosed?  This condition may be diagnosed based on:  Your symptoms.  Your medical and mental health history. You may be asked questions about your lifestyle, including any drug and alcohol use.  A physical exam.  Blood tests to rule out other conditions.  MDD is confirmed if you have the following symptoms most of the day,  nearly every day, in a 2-week period:  Either a depressed mood or loss of interest.  At least four other MDD symptoms.  How is this treated?  This condition is usually treated by mental health professionals, such as psychologists, psychiatrists, and clinical social workers. You may need more than one type of treatment. Treatment may include:  Psychotherapy, also called talk therapy or counseling. Types of psychotherapy include:  Cognitive behavioral therapy (CBT). This teaches you to recognize unhealthy feelings, thoughts, and behaviors, and replace them with positive thoughts and actions.  Interpersonal therapy (IPT). This helps you to improve the way you communicate with others or relate to them.  Family therapy. This treatment includes members of your family.  Medicines to treat anxiety and depression. These medicines help to balance the brain chemicals that affect your emotions.  Lifestyle changes. You may be asked to:  Limit alcohol use and avoid drug use.  Get regular exercise.  Get plenty of sleep.  Make healthy eating choices.  Spend more time outdoors.  Brain stimulation. This may be done if symptoms are very severe and other treatments have not worked. Examples of this treatment are electroconvulsive therapy and transcranial magnetic stimulation.  Follow these instructions at home:  Activity  Exercise regularly and spend time outdoors.  Find activities that you enjoy doing, and make time to do them.  Find healthy ways to manage stress, such as:  Meditation or deep breathing.  Spending time in nature.  Journaling.  Return to your normal activities as told by your health care provider. Ask your health care provider what activities are safe for you.  Alcohol and drug use  If you drink alcohol:  Limit how much you use to:  0-1 drink a day for women who are not pregnant.  0-2 drinks a day for men.  Be aware of how much alcohol is in your drink. In the [place] one drink equals one 12 oz bottle of beer (355 mL),  one 5 oz glass of wine (148 mL), or one 1½ oz glass of hard liquor (44 mL).  Discuss your alcohol use with your health care provider. Alcohol can affect any antidepressant medicines you are taking.  Discuss any drug use with your health care provider.  General instructions    Take over-the-counter and prescription medicines only as told by your health care provider.  Eat a healthy diet and get plenty of sleep.  Consider joining a support group. Your health care provider may be able to recommend one.  Keep all follow-up visits as told by your health care provider. This is important.  Where to find more information  National [place] on Mental Illness: www.sammy.org  [place] National Central of Mental Health: www.nimh.nih.gov  Contact a health care provider if:  Your symptoms get worse.  You develop new symptoms.  Get help right away if:  You self-harm.  You have serious thoughts about hurting yourself or others.  You hallucinate.  If you ever feel like you may hurt yourself or others, or have thoughts about taking your own life, get help right away. Go to your nearest emergency department or:  Call your local emergency services (688 in the [place]  Call a suicide crisis helpline, such as the National Suicide Prevention Lifeline at 1-478.535.6426 or 666 in the [place] This is open 24 hours a day in the [place]  Text the Crisis Text Line at 008268 (in the [place]  Summary  Major depressive disorder (MDD) is a mental health condition. MDD causes symptoms of sadness, hopelessness, and loss of interest in things. These symptoms last most of the day, almost every day, for 2 weeks.  The symptoms of MDD can interfere with relationships and with everyday activities.  Treatments and support are available for people who develop MDD. You may need more than one type of treatment.  Get help right away if you have serious thoughts about hurting yourself or others.  This information is not intended to replace advice given to you by your  health care provider. Make sure you discuss any questions you have with your health care provider.  Document Revised: 07/13/2022 Document Reviewed: 11/28/2020  Elsevier Patient Education © 2022 [employer] Inc.

## 2023-03-06 NOTE — PROGRESS NOTES
"  Lizzie Farrar presents to [facility] for     Chief Complaint  Chief Complaint   Patient presents with   • genetic testing     Pt would Genesight testing to see which possible medication would be most effective for anxiety/depression as her current meds aren't helping.    • referral     Pt would like to be tested for ADD        PCP:  Milagro Wallis APRN    Subjective        History of Present Illness      Lizzie is a 28-year-old female who is here to request genetic testing to see which medications may work for anxiety and depression.  She would also like a referral to see psychiatry so that she can be tested for ADD.  She was previously seeing 3 different psychiatrists and needs a referral to someone new as the first 1 left and she did not feel like she was a good fit with the replacement.  She is no longer on medications at this time.  She tells me that she has always had poor memory with trouble concentrating.    She tells me feels like she is \"crazy\".   Brother just diagnosed with ADHD and she thinks she has some similar characteristics   she did not do well with the medications that she was on with psych.   She felt numb on them and like a zombie.   She has tried: abilify, buspar, wellbutrin, zoloft    PHQ-9 Depression Screening  Little interest or pleasure in doing things? 3-->nearly every day   Feeling down, depressed, or hopeless? 3-->nearly every day   Trouble falling or staying asleep, or sleeping too much? 3-->nearly every day   Feeling tired or having little energy? 3-->nearly every day   Poor appetite or overeating? 3-->nearly every day   Feeling bad about yourself - or that you are a failure or have let yourself or your family down? 3-->nearly every day   Trouble concentrating on things, such as reading the newspaper or watching television? 0-->not at all   Moving or speaking so slowly that other people could have noticed? Or the opposite - being so fidgety or " restless that you have been moving around a lot more than usual? 0-->not at all   Thoughts that you would be better off dead, or of hurting yourself in some way? 2-->more than half the days   PHQ-9 Total Score 20   If you checked off any problems, how difficult have these problems made it for you to do your work, take care of things at home, or get along with other people? very difficult     [place] Suicide Severity Rating (C-SSRS)  In the past month, have you wished you were dead or wished you could go to sleep and not wake up? yes     In the past month, have you actually had any thoughts of killing yourself? no     Have you been thinking about how you might do this? no     Have you had these thoughts and had some intention of acting on them? no     Have you started to work out or have you worked out the details of how to kill yourself? no     Have you ever in your lifetime done anything, started to do anything, or prepared to do anything to end your life? no       Was this within the past three months?       Level of Risk per Screen low risk       Anxiety BOSSMAN-7    Feeling nervous, anxious or on edge: Nearly every day  Not being able to stop or control worrying: Nearly every day  Worrying too much about different things: Nearly every day  Trouble Relaxing: Nearly every day  Being so restless that it is hard to sit still: Several days  Becoming easily annoyed or irritable: Nearly every day  Feeling afraid as if something awful might happen: Not at all  BOSSMAN 7 Total Score: 16  If you checked any problems, how difficult have these problems made it for you to do your work, take care of things at home, or get along with other people: Very difficult     No Known Allergies  Current Outpatient Medications on File Prior to Visit   Medication Sig Dispense Refill   • Potassium (POTASSIMIN PO) Take  by mouth.     • vitamin D3 125 MCG (5000 UT) capsule capsule Take 1 capsule by mouth Daily.     • ARIPiprazole (ABILIFY) 5 MG  "tablet 1 tablet Daily.     • hydrOXYzine pamoate (VISTARIL) 25 MG capsule Take 1 capsule by mouth 3 (Three) Times a Day As Needed for Anxiety. 90 capsule 1     No current facility-administered medications on file prior to visit.         The following portions of the patient's history were reviewed and updated as appropriate: allergies, current medications, past family history, past medical history, past social history, past surgical history and problem list and are available for review within electronic record    Objective     Result Review :                    Vital Signs:   /68 (BP Location: Right arm, Patient Position: Sitting, Cuff Size: Adult)   Pulse 76   Temp 97.3 °F (36.3 °C) (Infrared)   Resp 18   Ht 155.6 cm (61.25\")   Wt 83.3 kg (183 lb 9.6 oz)   SpO2 99%   BMI 34.41 kg/m²         Physical Exam  Vitals and nursing note reviewed.   Constitutional:       General: She is not in acute distress.     Appearance: Normal appearance. She is well-developed. She is not ill-appearing or diaphoretic.   HENT:      Head: Normocephalic and atraumatic.   Eyes:      General: No scleral icterus.     Conjunctiva/sclera: Conjunctivae normal.   Neck:      Vascular: No JVD.   Cardiovascular:      Rate and Rhythm: Normal rate and regular rhythm.      Chest Wall: PMI is not displaced. No thrill.      Heart sounds: Normal heart sounds. No murmur heard.  Pulmonary:      Effort: Pulmonary effort is normal. No accessory muscle usage or respiratory distress.      Breath sounds: Normal breath sounds.   Chest:      Chest wall: No tenderness.   Abdominal:      General: Bowel sounds are normal. There is no distension.      Palpations: Abdomen is soft.      Tenderness: There is no abdominal tenderness.   Musculoskeletal:         General: Normal range of motion.      Cervical back: Normal range of motion.      Right lower leg: No edema.      Left lower leg: No edema.   Skin:     General: Skin is warm and dry.      Coloration: " Skin is not ashen, jaundiced, mottled or pale.      Findings: No erythema.   Neurological:      General: No focal deficit present.      Mental Status: She is alert and oriented to person, place, and time.   Psychiatric:         Attention and Perception: Attention and perception normal.         Mood and Affect: Mood and affect normal.         Behavior: Behavior normal. Behavior is cooperative.         Thought Content: Thought content normal. Thought content is not paranoid or delusional. Thought content does not include homicidal or suicidal ideation. Thought content does not include homicidal or suicidal plan.         Cognition and Memory: Cognition and memory normal.         Judgment: Judgment normal.                 Assessment and Plan      Diagnoses and all orders for this visit:    1. Anxiety (Primary)  -     GeneSight - Swab,; Future  -     Ambulatory Referral to Behavioral Health  -     escitalopram (Lexapro) 10 MG tablet; Take 1/2 pill daily for 2 weeks then may increase to 1 pill daily.  Dispense: 30 tablet; Refill: 1    2. Moderate major depression (HCC)  -     GeneSight - Swab,; Future  -     Ambulatory Referral to Behavioral Health  -     escitalopram (Lexapro) 10 MG tablet; Take 1/2 pill daily for 2 weeks then may increase to 1 pill daily.  Dispense: 30 tablet; Refill: 1      She denies any current thoughts, plans, or intentions for suicide.  Advised to go to the nearest emergency department if she should develop these.  We will go ahead and do GeneSight testing, refer to behavioral health for med management as well as evaluation for possible ADHD.  We will go ahead and start her on Lexapro.  She will start with 5 mg daily for the first 2 weeks and then can increase to 10 mg daily thereafter.  No safety risks identified today and no evidence of psychosis.  Adverse effects and expectations of the Lexapro discussed at length.  She will come back in 3 to 4 weeks for reevaluation  We can review the results  of her GeneSight testing at that visit.  If she would like those results prior to that visit, she has been asked to follow-up sooner.    Follow Up     Patient was given instructions and counseling regarding her condition or for health maintenance advice. Please see specific information pulled into the AVS if appropriate.   Any new medication's adverse effects have been discussed in detail with patient  Patient was encouraged to keep me informed of any acute changes, lack of improvement, or any new concerning symptoms.    Return in 3 weeks (on 3/27/2023) for Recheck.          Dictated Utilizing Dragon Dictation   Please note that portions of this note were completed with a voice recognition program.   Part of this note may be an electronic transcription/translation of spoken language to printed text using the Dragon Dictation System.

## 2023-04-12 ENCOUNTER — OFFICE VISIT (OUTPATIENT)
Dept: INTERNAL MEDICINE | Facility: CLINIC | Age: 28
End: 2023-04-12
Payer: COMMERCIAL

## 2023-04-12 VITALS
HEART RATE: 68 BPM | RESPIRATION RATE: 16 BRPM | DIASTOLIC BLOOD PRESSURE: 64 MMHG | WEIGHT: 183.2 LBS | HEIGHT: 61 IN | BODY MASS INDEX: 34.59 KG/M2 | OXYGEN SATURATION: 97 % | SYSTOLIC BLOOD PRESSURE: 102 MMHG | TEMPERATURE: 97.1 F

## 2023-04-12 DIAGNOSIS — F32.1 MODERATE MAJOR DEPRESSION: ICD-10-CM

## 2023-04-12 DIAGNOSIS — F41.9 ANXIETY: Primary | ICD-10-CM

## 2023-04-12 PROCEDURE — 99214 OFFICE O/P EST MOD 30 MIN: CPT | Performed by: NURSE PRACTITIONER

## 2023-04-12 RX ORDER — FLUOXETINE HYDROCHLORIDE 20 MG/1
20 CAPSULE ORAL DAILY
Qty: 30 CAPSULE | Refills: 1 | Status: SHIPPED | OUTPATIENT
Start: 2023-04-12

## 2023-04-12 NOTE — PROGRESS NOTES
"  Lizzie Farrar presents to [facility] for     Chief Complaint  Chief Complaint   Patient presents with   • Results     Pt here to review results from genesight testing   • Anxiety   • Depression       PCP:  Milagro Wallis APRN    Subjective        History of Present Illness    Lizzie is a 28-year-old female who has anxiety and depression  She was previously seeing 3 different psychiatrists.  At her last visit, I gave her a new referral.    Has appt next Monday with Katherin Harkins  She tells me that she has always had poor memory with trouble concentrating.    She tells me feels like she is \"crazy\".   Brother just diagnosed with ADHD and she thinks she has some similar characteristics   she did not do well with the medications that she was on with psych.   She felt numb on them and like a zombie.   She has tried: abilify, buspar, wellbutrin, zoloft  at last visit started lexapro   Completed genesight.       Review of Systems   Constitutional: Positive for fatigue. Negative for fever and unexpected weight loss.   Eyes: Negative for blurred vision, double vision and visual disturbance.   Respiratory: Negative for cough, shortness of breath and wheezing.    Cardiovascular: Negative for chest pain, palpitations and leg swelling.   Gastrointestinal: Negative for abdominal pain, constipation, diarrhea, nausea and vomiting.   Genitourinary: Negative for difficulty urinating, frequency and urgency.   Musculoskeletal: Negative for arthralgias and myalgias.   Skin: Negative for color change and rash.   Neurological: Negative for dizziness, weakness and headache.   Hematological: Negative for adenopathy. Does not bruise/bleed easily.   Psychiatric/Behavioral: Positive for decreased concentration, sleep disturbance, depressed mood and stress. Negative for self-injury and suicidal ideas. The patient is nervous/anxious.          No Known Allergies  Current Outpatient Medications on File Prior " "to Visit   Medication Sig Dispense Refill   • MAG THREONATE-NIACINAMIDE ER PO Take 2 capsules by mouth Daily.     • vitamin D3 125 MCG (5000 UT) capsule capsule Take 1 capsule by mouth Daily.     • [DISCONTINUED] escitalopram (Lexapro) 10 MG tablet Take 1/2 pill daily for 2 weeks then may increase to 1 pill daily. 30 tablet 1   • [DISCONTINUED] Potassium (POTASSIMIN PO) Take  by mouth.     • hydrOXYzine pamoate (VISTARIL) 25 MG capsule Take 1 capsule by mouth 3 (Three) Times a Day As Needed for Anxiety. (Patient not taking: Reported on 4/12/2023) 90 capsule 1   • [DISCONTINUED] ARIPiprazole (ABILIFY) 5 MG tablet 1 tablet Daily. (Patient not taking: Reported on 4/12/2023)       No current facility-administered medications on file prior to visit.         The following portions of the patient's history were reviewed and updated as appropriate: allergies, current medications, past family history, past medical history, past social history, past surgical history and problem list and are available for review within electronic record    Objective     Result Review :                    Vital Signs:   /64 (BP Location: Right arm, Patient Position: Sitting, Cuff Size: Adult)   Pulse 68   Temp 97.1 °F (36.2 °C) (Infrared)   Resp 16   Ht 155.6 cm (61.25\")   Wt 83.1 kg (183 lb 3.2 oz)   SpO2 97%   BMI 34.33 kg/m²         Physical Exam  Constitutional:       Appearance: Normal appearance. She is well-developed.   HENT:      Head: Normocephalic and atraumatic.   Eyes:      General: No scleral icterus.     Conjunctiva/sclera: Conjunctivae normal.   Cardiovascular:      Rate and Rhythm: Normal rate and regular rhythm.      Heart sounds: Normal heart sounds.   Pulmonary:      Effort: Pulmonary effort is normal. No respiratory distress.      Breath sounds: Normal breath sounds.   Abdominal:      General: Bowel sounds are normal.      Palpations: Abdomen is soft.      Tenderness: There is no abdominal tenderness. "   Musculoskeletal:         General: Normal range of motion.      Cervical back: Normal range of motion.      Right lower leg: No edema.      Left lower leg: No edema.   Skin:     General: Skin is warm and dry.   Neurological:      General: No focal deficit present.      Mental Status: She is alert and oriented to person, place, and time.   Psychiatric:         Attention and Perception: Attention normal.         Mood and Affect: Mood and affect normal.         Behavior: Behavior normal. Behavior is cooperative.         Thought Content: Thought content normal.         Cognition and Memory: Cognition normal.         Judgment: Judgment normal.                 Assessment and Plan      Diagnoses and all orders for this visit:    1. Anxiety (Primary)  -     FLUoxetine (PROzac) 20 MG capsule; Take 1 capsule by mouth Daily.  Dispense: 30 capsule; Refill: 1    2. Moderate major depression  -     FLUoxetine (PROzac) 20 MG capsule; Take 1 capsule by mouth Daily.  Dispense: 30 capsule; Refill: 1      Anxiety depression are uncontrolled.  No evidence of psychosis.  She does not feel like the Lexapro has helped.  [employer] test results reviewed.  We will switch over to Prozac and see how she does with this.  Follow-up with psychiatry as planned.  She will see me again in 4 to 6 weeks for follow-up as well.  Follow Up     Patient was given instructions and counseling regarding her condition or for health maintenance advice. Please see specific information pulled into the AVS if appropriate.   Any new medication's adverse effects have been discussed in detail with patient  Patient was encouraged to keep me informed of any acute changes, lack of improvement, or any new concerning symptoms.    Return in about 6 weeks (around 5/24/2023) for Recheck.          Dictated Utilizing Dragon Dictation   Please note that portions of this note were completed with a voice recognition program.   Part of this note may be an electronic  transcription/translation of spoken language to printed text using the Dragon Dictation System.

## 2023-04-17 ENCOUNTER — TELEMEDICINE (OUTPATIENT)
Dept: PSYCHIATRY | Facility: CLINIC | Age: 28
End: 2023-04-17
Payer: COMMERCIAL

## 2023-04-17 ENCOUNTER — TELEPHONE (OUTPATIENT)
Dept: PSYCHIATRY | Facility: CLINIC | Age: 28
End: 2023-04-17

## 2023-04-17 VITALS — WEIGHT: 180 LBS | BODY MASS INDEX: 33.73 KG/M2

## 2023-04-17 DIAGNOSIS — F41.1 GENERALIZED ANXIETY DISORDER: ICD-10-CM

## 2023-04-17 DIAGNOSIS — E55.9 VITAMIN D DEFICIENCY: ICD-10-CM

## 2023-04-17 DIAGNOSIS — F98.8 ATTENTION DEFICIT DISORDER (ADD) IN ADULT: Primary | ICD-10-CM

## 2023-04-17 DIAGNOSIS — Z79.899 HIGH RISK MEDICATION USE: ICD-10-CM

## 2023-04-17 DIAGNOSIS — F33.0 MILD EPISODE OF RECURRENT MAJOR DEPRESSIVE DISORDER: ICD-10-CM

## 2023-04-17 PROCEDURE — 90792 PSYCH DIAG EVAL W/MED SRVCS: CPT | Performed by: NURSE PRACTITIONER

## 2023-04-17 RX ORDER — ATOMOXETINE 40 MG/1
CAPSULE ORAL
Qty: 60 CAPSULE | Refills: 0 | Status: SHIPPED | OUTPATIENT
Start: 2023-04-17 | End: 2023-05-17

## 2023-04-17 RX ORDER — VILOXAZINE HYDROCHLORIDE 200 MG/1
CAPSULE, EXTENDED RELEASE ORAL
Qty: 69 CAPSULE | Refills: 0 | Status: SHIPPED | OUTPATIENT
Start: 2023-04-17 | End: 2023-05-17

## 2023-04-17 NOTE — TELEPHONE ENCOUNTER
[employer] savings card activated, and info given to pharmacy.  ID: [id number]  BIN: [id number]  PCN: [id number]

## 2023-04-17 NOTE — TELEPHONE ENCOUNTER
Checked on PT. Medication, and applied discount card. The pharmacy stated that the Strattera would be 169.99 for a month.     Please advise on alternatives.

## 2023-04-17 NOTE — PROGRESS NOTES
This provider is located at the [facility] (through [facility]), [address], [facility], [address] using a secure [employer] Video Visit through [employer]. Patient is being seen remotely via telehealth at their home address [address] [place] [address], and stated they are in a secure environment for this session. The patient's condition being diagnosed/treated is appropriate for telemedicine. The provider identified herself as well as her credentials.   The patient, and/or patients guardian, consent to be seen remotely, and when consent is given they understand that the consent allows for patient identifiable information to be sent to a third party as needed.   They may refuse to be seen remotely at any time. The electronic data is encrypted and password protected, and the patient and/or guardian has been advised of the potential risks to privacy not withstanding such measures.    You have chosen to receive care through a telehealth visit.  Do you consent to use a video/audio connection for your medical care today? Yes    Patient identifiers utilized: Name and date of birth.    Patient verbally confirmed consent for today's encounter:  April 17, 2023    Subjective   Lizzie Farrar is a 28 y.o. female who presents today for initial evaluation     Chief Complaint:    Chief Complaint   Patient presents with   • Psychiatric Evaluation        History of Present Illness:    History of Present Illness  Lizzie is a 28-year-old female seen to establish outpatient behavioral health services.  Her PCP has referred her for evaluation for ADD.  Her PCP has been treating her for anxiety and depression.  Patient reports that she has seen 3 psychiatrists in the past but that her last mental health provider switched to a different location in patient's psychiatric care was neglected at that time.  She also reports she previously was active in therapy but [facility]  "and reports this was only partially helpful.    Psychosocial history:  Born: [place], raised until 11, came to [place]  Raised by: parents/grandparents; was  from mother while transitioning from [place]  Siblings: one sister, one brother  Describes childhood as: \"rough\" but decent  Abuse history:  \" Physical: denies  \" Emotional: states she was \"sheltered\", not allowed to be as free as she was in [place]. Reports father was very strict on her.   \" Mental: denies  \" Sexual: denies  \" Rape: denies    Highest education level achieved: technical school for MA  Work history: works as a MA  Developmental delays/Special education classes: reports she always struggled; states she was a good student but homework took her an excessive amount of time.   Impulsive or risky behavior: denies  Legal history/previous chargers or incarcerations: denies  Marital status: single  Number of children: none  Self-harming behavior: denies  Suicidal attempts: denies  Social support: sister    Previous diagnoses: depression, anxiety  Inpatient psychiatric admissions: denies  History of psychotherapy: yes; 2-3 years ago, before Covid, depression and anxiety, reports this was somewhat \"not really\" helpful  History of behavioral health treatment:   Medications trialed: abilify, buspar, wellbutrin, zoloft, lexapro     Family mental health: brother with ADHD    Symptom burden:  Sleep: sometimes \"sleeps too much\", \"sometimes doesn't sleep at all\".  Appetite: only eats when she gets physically hungry, forgets to eat sometimes, and eats a lot of junk food  Anxiety: nausea, vomiting when anxious  Paranoia: denies  Hallucinations: denies  Mood fluctuations: feels this way all the time; feels better at the beginning of her period, the downs last approximately a day at most.  She reports she has always had poor memory with trouble concentrating.    Patient administered the adult ADHD self symptom checklist and did screen positive for inattentive " symptoms not hyperactivity.  She also has symptoms consistent with DSM-V criteria for diagnosis of ADD, inattentive type.  Patient is open-minded to psychotropic medication management.           The following portions of the patient's history were reviewed and updated as appropriate: allergies, current medications, past family history, past medical history, past social history, past surgical history and problem list.    Past Psychiatric History:  Began Treatment:A few years ago  Diagnoses:Depression, Anxiety and Was also told by previous provider they were concerned she had borderline personality disorder but she states this was never assigned to her as a diagnosis officially.  She does not have symptoms consistent with that diagnosis during my interview with the patient.  She is educated that should this diagnosis be suspected in the future she will be referred to psychology for full psychological evaluation.  She verbalizes understanding.  Psychiatrist:In the past but not currently  Therapist:Before COVID  Admission History:Denies  Medication Trials:See HPI  Self Harm: Denies  Suicide Attempts:Denies   Psychosis, Anxiety, Depression: Not applicable    Past Medical History:  Past Medical History:   Diagnosis Date   • Abnormal Pap smear of cervix 2019    LSIL   • Anxiety    • COVID 2020 and    • Depression    • Nexplanon in placed on 10/25/2019 10/25/2019    Removed 2021   • Strep throat        Substance Abuse History:   Types:Denies all, including illicit  Withdrawal Symptoms:Denies  Longest Period Sober:Not Applicable   AA: Not applicable     Social History:  Social History     Socioeconomic History   • Marital status: Single   • Number of children: 0   • Highest education level: Some college, no degree   Tobacco Use   • Smoking status: Never   • Smokeless tobacco: Never   Vaping Use   • Vaping Use: Never used   Substance and Sexual Activity   • Alcohol use: Yes     Alcohol/week: 1.0  standard drink     Types: 1 Cans of beer per week     Comment: Social   • Drug use: No   • Sexual activity: Not Currently     Partners: Male     Birth control/protection: Condom       Family History:  Family History   Problem Relation Age of Onset   • Hyperlipidemia Mother    • No Known Problems Father    • Cirrhosis Maternal Uncle         alcohol   • Pancreatic cancer Maternal Uncle    • Hypertension Maternal Grandmother    • Stroke Neg Hx    • Heart attack Neg Hx    • Breast cancer Neg Hx    • Cervical cancer Neg Hx    • Lung cancer Neg Hx    • Ovarian cancer Neg Hx    • Colon cancer Neg Hx        Past Surgical History:  Past Surgical History:   Procedure Laterality Date   • TONSILLECTOMY  2019       Problem List:  Patient Active Problem List   Diagnosis   • Tonsillar hypertrophy   • Vitamin D deficiency   • Anxiety   • Moderate major depression       Allergy:   No Known Allergies     Current Medications:   Current Outpatient Medications   Medication Sig Dispense Refill   • FLUoxetine (PROzac) 20 MG capsule Take 1 capsule by mouth Daily. 30 capsule 1   • hydrOXYzine pamoate (VISTARIL) 25 MG capsule Take 1 capsule by mouth 3 (Three) Times a Day As Needed for Anxiety. 90 capsule 1   • MAG THREONATE-NIACINAMIDE ER PO Take 2 capsules by mouth Daily.     • vitamin D3 125 MCG (5000 UT) capsule capsule Take 1 capsule by mouth Daily.       No current facility-administered medications for this visit.       Review of Systems:    Review of Systems   Psychiatric/Behavioral: Positive for decreased concentration, sleep disturbance and depressed mood. The patient is nervous/anxious.    All other systems reviewed and are negative.        Physical Exam:   Physical Exam  Constitutional:       General: She is awake.      Appearance: Normal appearance. She is well-developed and well-groomed.   Neurological:      Mental Status: She is alert and oriented to person, place, and time.   Psychiatric:         Attention and Perception:  Perception normal. She is inattentive.         Mood and Affect: Mood is depressed.         Speech: Speech normal.         Behavior: Behavior normal. Behavior is cooperative.         Thought Content: Thought content normal.         Cognition and Memory: Cognition and memory normal.         Judgment: Judgment normal.         Vitals:  Weight 81.6 kg (180 lb), last menstrual period 03/22/2023, not currently breastfeeding. Body mass index is 33.73 kg/m².  Due to extenuating circumstances and possible current health risks associated with the patient being present in a clinical setting (with current health restrictions in place in regards to possible COVID 19 transmission/exposure), the patient was seen remotely today via a [facility] Video Visit through [employer].  Unable to obtain vital signs due to nature of remote visit.  Height stated at 62 inches.  Weight stated at 180 pounds.    Last 3 Blood Pressure Readings:  BP Readings from Last 3 Encounters:   04/12/23 102/64   03/06/23 106/68   05/18/22 116/64       PHQ-9 Score:   PHQ-9 Total Score:      BOSSMAN-7 Score:   Feeling nervous, anxious or on edge: (P) Nearly every day  Not being able to stop or control worrying: (P) More than half the days  Worrying too much about different things: (P) More than half the days  Trouble Relaxing: (P) More than half the days  Being so restless that it is hard to sit still: (P) Not at all  Feeling afraid as if something awful might happen: (P) Not at all  Becoming easily annoyed or irritable: (P) Nearly every day  BOSSMAN 7 Total Score: (P) 12  If you checked any problems, how difficult have these problems made it for you to do your work, take care of things at home, or get along with other people: (P) Somewhat difficult     Mental Status Exam:   Hygiene:   good  Cooperation:  Cooperative  Eye Contact:  Good  Psychomotor Behavior:  Appropriate  Affect:  Blunted  Mood: depressed  Hopelessness: Denies  Speech:  Normal  Thought Process:  Goal directed  and Linear  Thought Content:  Normal  Suicidal:  None  Homicidal:  None  Hallucinations:  None  Delusion:  None  Memory:  Intact  Orientation:  Person, Place, Time and Situation  Reliability:  good  Insight:  Good  Judgement:  Good  Impulse Control:  Good  Physical/Medical Issues:  No        Lab Results:   No visits with results within 6 Month(s) from this visit.   Latest known visit with results is:   Lab on 05/18/2022   Component Date Value Ref Range Status   • WBC 05/18/2022 6.49  3.40 - 10.80 10*3/mm3 Final   • RBC 05/18/2022 4.69  3.77 - 5.28 10*6/mm3 Final   • Hemoglobin 05/18/2022 14.0  12.0 - 15.9 g/dL Final   • Hematocrit 05/18/2022 40.4  34.0 - 46.6 % Final   • MCV 05/18/2022 86.1  79.0 - 97.0 fL Final   • MCH 05/18/2022 29.9  26.6 - 33.0 pg Final   • MCHC 05/18/2022 34.7  31.5 - 35.7 g/dL Final   • RDW 05/18/2022 12.5  12.3 - 15.4 % Final   • RDW-SD 05/18/2022 38.1  37.0 - 54.0 fl Final   • MPV 05/18/2022 10.4  6.0 - 12.0 fL Final   • Platelets 05/18/2022 269  140 - 450 10*3/mm3 Final   • Glucose 05/18/2022 97  65 - 99 mg/dL Final   • BUN 05/18/2022 11  6 - 20 mg/dL Final   • Creatinine 05/18/2022 0.74  0.57 - 1.00 mg/dL Final   • Sodium 05/18/2022 139  136 - 145 mmol/L Final   • Potassium 05/18/2022 3.9  3.5 - 5.2 mmol/L Final   • Chloride 05/18/2022 106  98 - 107 mmol/L Final   • CO2 05/18/2022 23.0  22.0 - 29.0 mmol/L Final   • Calcium 05/18/2022 8.7  8.6 - 10.5 mg/dL Final   • Total Protein 05/18/2022 6.9  6.0 - 8.5 g/dL Final   • Albumin 05/18/2022 4.30  3.50 - 5.20 g/dL Final   • ALT (SGPT) 05/18/2022 21  1 - 33 U/L Final   • AST (SGOT) 05/18/2022 22  1 - 32 U/L Final   • Alkaline Phosphatase 05/18/2022 64  39 - 117 U/L Final   • Total Bilirubin 05/18/2022 0.2  0.0 - 1.2 mg/dL Final   • Globulin 05/18/2022 2.6  gm/dL Final   • A/G Ratio 05/18/2022 1.7  g/dL Final   • BUN/Creatinine Ratio 05/18/2022 14.9  7.0 - 25.0 Final   • Anion Gap 05/18/2022 10.0  5.0 - 15.0 mmol/L Final   • eGFR 05/18/2022  113.9  >60.0 mL/min/1.73 Final    National Kidney Foundation and American Society of Nephrology (ASN) Task Force recommended calculation based on the Chronic Kidney Disease Epidemiology Collaboration (CKD-EPI) equation refit without adjustment for race.   • Total Cholesterol 05/18/2022 219 (H)  0 - 200 mg/dL Final   • Triglycerides 05/18/2022 107  0 - 150 mg/dL Final   • HDL Cholesterol 05/18/2022 51  40 - 60 mg/dL Final   • LDL Cholesterol  05/18/2022 149 (H)  0 - 100 mg/dL Final   • VLDL Cholesterol 05/18/2022 19  5 - 40 mg/dL Final   • LDL/HDL Ratio 05/18/2022 2.87   Final         Assessment & Plan   Assessment     ICD-10-CM ICD-9-CM   1. Attention deficit disorder (ADD) in adult  F98.8 314.00   2. Generalized anxiety disorder  F41.1 300.02   3. Mild episode of recurrent major depressive disorder  F33.0 296.31   4. High risk medication use  Z79.899 V58.69   5. Vitamin D deficiency  E55.9 268.9         Start Strattera 40 mg daily for 1 week, then increase to 80 mg daily  Continue Prozac 20 mg daily  Check vitamin D.  Last level checked in 2019 resulted 11.  Patient educated on the effects of vitamin D on her overall mood, depression, anxiety.  Check vitamin B12, magnesium, methylmalonic acid, TSH, CBC, CMP.  Follow-up in 3 weeks or sooner if needed  Gene site testing reviewed and appreciated    Discussed [facility]ODY docket [id number] with the patient and educated her that should she require stimulant therapy in the future, her PCP would need to refer her back to an person psychiatric care for medication management for ADD.    SAFETY PLAN  Patient agrees to call 911/go to the nearest emergency department if he/she develops new or worsening SI, or develops intent or plan to act on these thoughts. Patient is agreeable to all elements of safety plan and verbalizes understanding.    GOALS:  Short Term Goals: Patient will be compliant with medication, and patient will have no significant medication related side effects.  Patient will  be engaged in psychotherapy as indicated.  Patient will report subjective improvement of symptoms.  Long term goals: To stabilize mood and treat/improve subjective symptoms, the patient will stay out of the hospital, the patient will be at an optimal level of functioning, and the patient will take all medications as prescribed.  The patient/guardian verbalized understanding and agreement with goals that were mutually set.      TREATMENT PLAN: Continue supportive psychotherapy efforts and medications as indicated.  Pharmacological and Non-Pharmacological treatment options discussed during today's visit. Patient/Guardian acknowledged and verbally consented with current treatment plan and was educated on the importance of compliance with treatment and follow-up appointments.      MEDICATION ISSUES:  Discussed medication options and treatment plan of prescribed medication as well as the risks, benefits, any black box warnings, and side effects including potential falls, possible impaired driving, and metabolic adversities among others. Patient is agreeable to call the office with any worsening of symptoms or onset of side effects, or if any concerns or questions arise.  The contact information for the office is made available to the patient. Patient is agreeable to call 911 or go to the nearest ER should they begin having any SI/HI, or if any urgent concerns arise. No medication side effects or related complaints today.     Medication Changes During Visit:   There are no discontinued medications.   No orders of the defined types were placed in this encounter.      Follow Up Appointment:   Return in about 3 weeks (around 5/8/2023) for Recheck.           This document has been electronically signed by YEE Shelby  April 17, 2023 10:03 EDT    Dictated Utilizing Dragon Dictation: Part of this note may be an electronic transcription/translation of spoken language to printed text using the Dragon Dictation  System.

## 2023-05-03 ENCOUNTER — LAB (OUTPATIENT)
Dept: LAB | Facility: HOSPITAL | Age: 28
End: 2023-05-03
Payer: COMMERCIAL

## 2023-05-03 DIAGNOSIS — E55.9 AVITAMINOSIS D: ICD-10-CM

## 2023-05-03 DIAGNOSIS — Z79.899 ENCOUNTER FOR LONG-TERM (CURRENT) USE OF OTHER MEDICATIONS: ICD-10-CM

## 2023-05-03 DIAGNOSIS — F41.1 GENERALIZED ANXIETY DISORDER: Primary | ICD-10-CM

## 2023-05-03 DIAGNOSIS — F33.0 MAJOR DEPRESSIVE DISORDER, RECURRENT EPISODE, MILD: ICD-10-CM

## 2023-05-03 LAB
BASOPHILS # BLD AUTO: 0.04 10*3/MM3 (ref 0–0.2)
BASOPHILS NFR BLD AUTO: 0.6 % (ref 0–1.5)
DEPRECATED RDW RBC AUTO: 37.3 FL (ref 37–54)
EOSINOPHIL # BLD AUTO: 0.17 10*3/MM3 (ref 0–0.4)
EOSINOPHIL NFR BLD AUTO: 2.6 % (ref 0.3–6.2)
ERYTHROCYTE [DISTWIDTH] IN BLOOD BY AUTOMATED COUNT: 12.1 % (ref 12.3–15.4)
HCT VFR BLD AUTO: 40.3 % (ref 34–46.6)
HGB BLD-MCNC: 14.1 G/DL (ref 12–15.9)
IMM GRANULOCYTES # BLD AUTO: 0.01 10*3/MM3 (ref 0–0.05)
IMM GRANULOCYTES NFR BLD AUTO: 0.2 % (ref 0–0.5)
LYMPHOCYTES # BLD AUTO: 3.03 10*3/MM3 (ref 0.7–3.1)
LYMPHOCYTES NFR BLD AUTO: 46.7 % (ref 19.6–45.3)
MCH RBC QN AUTO: 29.6 PG (ref 26.6–33)
MCHC RBC AUTO-ENTMCNC: 35 G/DL (ref 31.5–35.7)
MCV RBC AUTO: 84.5 FL (ref 79–97)
MONOCYTES # BLD AUTO: 0.72 10*3/MM3 (ref 0.1–0.9)
MONOCYTES NFR BLD AUTO: 11.1 % (ref 5–12)
NEUTROPHILS NFR BLD AUTO: 2.52 10*3/MM3 (ref 1.7–7)
NEUTROPHILS NFR BLD AUTO: 38.8 % (ref 42.7–76)
NRBC BLD AUTO-RTO: 0 /100 WBC (ref 0–0.2)
PLATELET # BLD AUTO: 295 10*3/MM3 (ref 140–450)
PMV BLD AUTO: 10.8 FL (ref 6–12)
RBC # BLD AUTO: 4.77 10*6/MM3 (ref 3.77–5.28)
WBC NRBC COR # BLD: 6.49 10*3/MM3 (ref 3.4–10.8)

## 2023-05-03 PROCEDURE — 82306 VITAMIN D 25 HYDROXY: CPT

## 2023-05-03 PROCEDURE — 36415 COLL VENOUS BLD VENIPUNCTURE: CPT

## 2023-05-03 PROCEDURE — 82607 VITAMIN B-12: CPT

## 2023-05-03 PROCEDURE — 80050 GENERAL HEALTH PANEL: CPT

## 2023-05-03 PROCEDURE — 83921 ORGANIC ACID SINGLE QUANT: CPT | Performed by: NURSE PRACTITIONER

## 2023-05-03 PROCEDURE — 83735 ASSAY OF MAGNESIUM: CPT

## 2023-05-04 LAB
25(OH)D3 SERPL-MCNC: 33.8 NG/ML (ref 30–100)
ALBUMIN SERPL-MCNC: 4.4 G/DL (ref 3.5–5.2)
ALBUMIN/GLOB SERPL: 1.6 G/DL
ALP SERPL-CCNC: 72 U/L (ref 39–117)
ALT SERPL W P-5'-P-CCNC: 37 U/L (ref 1–33)
ANION GAP SERPL CALCULATED.3IONS-SCNC: 10.9 MMOL/L (ref 5–15)
AST SERPL-CCNC: 31 U/L (ref 1–32)
BILIRUB SERPL-MCNC: 0.2 MG/DL (ref 0–1.2)
BUN SERPL-MCNC: 10 MG/DL (ref 6–20)
BUN/CREAT SERPL: 12.7 (ref 7–25)
CALCIUM SPEC-SCNC: 9.6 MG/DL (ref 8.6–10.5)
CHLORIDE SERPL-SCNC: 105 MMOL/L (ref 98–107)
CO2 SERPL-SCNC: 23.1 MMOL/L (ref 22–29)
CREAT SERPL-MCNC: 0.79 MG/DL (ref 0.57–1)
EGFRCR SERPLBLD CKD-EPI 2021: 104.6 ML/MIN/1.73
GLOBULIN UR ELPH-MCNC: 2.8 GM/DL
GLUCOSE SERPL-MCNC: 103 MG/DL (ref 65–99)
MAGNESIUM SERPL-MCNC: 2 MG/DL (ref 1.6–2.6)
POTASSIUM SERPL-SCNC: 4.4 MMOL/L (ref 3.5–5.2)
PROT SERPL-MCNC: 7.2 G/DL (ref 6–8.5)
SODIUM SERPL-SCNC: 139 MMOL/L (ref 136–145)
TSH SERPL DL<=0.05 MIU/L-ACNC: 0.82 UIU/ML (ref 0.27–4.2)
VIT B12 BLD-MCNC: 345 PG/ML (ref 211–946)

## 2023-05-08 ENCOUNTER — TELEMEDICINE (OUTPATIENT)
Dept: PSYCHIATRY | Facility: CLINIC | Age: 28
End: 2023-05-08
Payer: COMMERCIAL

## 2023-05-08 DIAGNOSIS — F41.1 GENERALIZED ANXIETY DISORDER: ICD-10-CM

## 2023-05-08 DIAGNOSIS — R11.2 DRUG-INDUCED NAUSEA AND VOMITING: ICD-10-CM

## 2023-05-08 DIAGNOSIS — F98.8 ATTENTION DEFICIT DISORDER (ADD) IN ADULT: Primary | ICD-10-CM

## 2023-05-08 DIAGNOSIS — F33.0 MILD EPISODE OF RECURRENT MAJOR DEPRESSIVE DISORDER: ICD-10-CM

## 2023-05-08 DIAGNOSIS — T50.905A DRUG-INDUCED NAUSEA AND VOMITING: ICD-10-CM

## 2023-05-08 RX ORDER — ONDANSETRON 4 MG/1
4 TABLET, ORALLY DISINTEGRATING ORAL DAILY PRN
Qty: 30 TABLET | Refills: 0 | Status: SHIPPED | OUTPATIENT
Start: 2023-05-08 | End: 2023-06-07

## 2023-05-08 NOTE — PROGRESS NOTES
This provider is located at the [facility] (through [facility]), [address], [place], [address] using a secure [employer] Video Visit through [employer]. Patient is being seen remotely via telehealth at their home address [address] [place] [address], and stated they are in a secure environment for this session. The patient's condition being diagnosed/treated is appropriate for telemedicine. The provider identified herself as well as her credentials.   The patient, and/or patients guardian, consent to be seen remotely, and when consent is given they understand that the consent allows for patient identifiable information to be sent to a third party as needed.   They may refuse to be seen remotely at any time. The electronic data is encrypted and password protected, and the patient and/or guardian has been advised of the potential risks to privacy not withstanding such measures.    You have chosen to receive care through a telehealth visit.  Do you consent to use a video/audio connection for your medical care today? Yes    Patient identifiers utilized: Name and date of birth.    Patient verbally confirmed consent for today's encounter:  May 8, 2023    Subjective   Lizzie Farrar is a 28 y.o. female who presents today for follow up    Chief Complaint:    Chief Complaint   Patient presents with   • Med Management     ADD        History of Present Illness:    History of Present Illness  Lizzie is a 28-year-old female seen in follow up for outpatient behavioral health services. She is being treated by this provider for ADD.    Strattera was prescribed for her at her initial visit however her insurance would not pay for this.  This was switched to Qelbree and titrated up per recommendation guidelines.  Patient states she feels she did much better on 400 mg than she has been since increasing to 600 mg.  She reports since increasing to the highest dosage she has been  "experiencing insomnia.    Her PCP had been treating her for anxiety and depression.  Patient reports that she has seen 3 psychiatrists in the past but that her last mental health provider switched to a different location in patient's psychiatric care was neglected at that time.  She also reports she previously was active in therapy but reports that was before COVID and reports this was only partially helpful.      Original symptom burden:  Sleep: sometimes \"sleeps too much\", \"sometimes doesn't sleep at all\".  Appetite: only eats when she gets physically hungry, forgets to eat sometimes, and eats a lot of junk food  Anxiety: nausea, vomiting when anxious  Paranoia: denies  Hallucinations: denies  Mood fluctuations: feels this way all the time; feels better at the beginning of her period, the downs last approximately a day at most.  She reports she has always had poor memory with trouble concentrating.    Patient administered the adult ADHD self symptom checklist and did screen positive for inattentive symptoms not hyperactivity.  She also has symptoms consistent with DSM-V criteria for diagnosis of ADD, inattentive type.  Patient is open-minded to psychotropic medication management.    Current symptom burden:   Sleep: reports at 400 mg, she had remission of ADD symptoms and sleep was disrupted but mangeable. At 600 mg, she is having insomnia.   Appetite: better, states she feels her appetite is normal at this time  Anxiety: Patient states that she has not had to have any of her hydroxyzine for anxiety reports it is improved at this time  Depression: Patient states that her depression has improved, states her family and coworkers have noticed a positive difference in her activity level and her socialization.  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           The following portions of the patient's history were reviewed and updated as appropriate: allergies, " current medications, past family history, past medical history, past social history, past surgical history and problem list.    Past Psychiatric History:  Began Treatment:A few years ago  Diagnoses:Depression, Anxiety and Was also told by previous provider they were concerned she had borderline personality disorder but she states this was never assigned to her as a diagnosis officially.  She does not have symptoms consistent with that diagnosis during my interview with the patient.  She is educated that should this diagnosis be suspected in the future she will be referred to psychology for full psychological evaluation.  She verbalizes understanding.  Psychiatrist:In the past but not currently  Therapist:Before COVID  Admission History:Denies  Medication Trials:abilify, buspar, wellbutrin, zoloft, lexapro, Qelbree  Self Harm: Denies  Suicide Attempts:Denies   Psychosis, Anxiety, Depression: Not applicable    Past Medical History:  Past Medical History:   Diagnosis Date   • Abnormal Pap smear of cervix 2019    LSIL   • Anxiety    • COVID 2020 and    • Depression    • Nexplanon in placed on 10/25/2019 10/25/2019    Removed 2021   • Strep throat        Substance Abuse History:   Types:Denies all, including illicit  Withdrawal Symptoms:Denies  Longest Period Sober:Not Applicable   AA: Not applicable     Social History:  Social History     Socioeconomic History   • Marital status: Single   • Number of children: 0   • Highest education level: Some college, no degree   Tobacco Use   • Smoking status: Never   • Smokeless tobacco: Never   Vaping Use   • Vaping Use: Never used   Substance and Sexual Activity   • Alcohol use: Yes     Alcohol/week: 1.0 standard drink     Types: 1 Cans of beer per week     Comment: Social   • Drug use: No   • Sexual activity: Not Currently     Partners: Male     Birth control/protection: Condom       Family History:  Family History   Problem Relation Age of Onset   •  Hyperlipidemia Mother    • No Known Problems Father    • Cirrhosis Maternal Uncle         alcohol   • Pancreatic cancer Maternal Uncle    • Hypertension Maternal Grandmother    • Stroke Neg Hx    • Heart attack Neg Hx    • Breast cancer Neg Hx    • Cervical cancer Neg Hx    • Lung cancer Neg Hx    • Ovarian cancer Neg Hx    • Colon cancer Neg Hx        Past Surgical History:  Past Surgical History:   Procedure Laterality Date   • TONSILLECTOMY  2019       Problem List:  Patient Active Problem List   Diagnosis   • Tonsillar hypertrophy   • Vitamin D deficiency   • Anxiety   • Moderate major depression       Allergy:   No Known Allergies     Current Medications:   Current Outpatient Medications   Medication Sig Dispense Refill   • FLUoxetine (PROzac) 20 MG capsule Take 1 capsule by mouth Daily. 30 capsule 1   • MAG THREONATE-NIACINAMIDE ER PO Take 2 capsules by mouth Daily.     • Viloxazine HCl ER (Qelbree) 200 MG capsule sustained-release 24 hr Take 1 capsule by mouth Daily for 7 days, THEN 2 capsules Daily for 7 days, THEN 3 capsules Daily for 16 days. 69 capsule 0   • vitamin D3 125 MCG (5000 UT) capsule capsule Take 1 capsule by mouth Daily.     • atomoxetine (Strattera) 40 MG capsule Take 1 capsule by mouth Daily for 7 days, THEN 2 capsules Daily for 23 days. 60 capsule 0   • hydrOXYzine pamoate (VISTARIL) 25 MG capsule Take 1 capsule by mouth 3 (Three) Times a Day As Needed for Anxiety. (Patient not taking: Reported on 5/8/2023) 90 capsule 1     No current facility-administered medications for this visit.       Review of Systems:    Review of Systems   Psychiatric/Behavioral: Positive for sleep disturbance.   All other systems reviewed and are negative.        Physical Exam:   Physical Exam  Constitutional:       General: She is awake.      Appearance: Normal appearance. She is well-developed and well-groomed.   Neurological:      Mental Status: She is alert and oriented to person, place, and time.    Psychiatric:         Attention and Perception: Attention and perception normal.         Mood and Affect: Mood and affect normal.         Speech: Speech normal.         Behavior: Behavior normal. Behavior is cooperative.         Thought Content: Thought content normal.         Cognition and Memory: Cognition and memory normal.         Judgment: Judgment normal.         Vitals:  not currently breastfeeding. There is no height or weight on file to calculate BMI.  Due to extenuating circumstances and possible current health risks associated with the patient being present in a clinical setting (with current health restrictions in place in regards to possible COVID 19 transmission/exposure), the patient was seen remotely today via a MyChart Video Visit through [employer].  Unable to obtain vital signs due to nature of remote visit.  Height stated at 62 inches.  Weight stated at 180 pounds.    Last 3 Blood Pressure Readings:  BP Readings from Last 3 Encounters:   04/12/23 102/64   03/06/23 106/68   05/18/22 116/64       PHQ-9 Score:   PHQ-9 Total Score:      BOSSMAN-7 Score:   Feeling nervous, anxious or on edge: (P) Several days  Not being able to stop or control worrying: (P) Nearly every day  Worrying too much about different things: (P) Several days  Trouble Relaxing: (P) More than half the days  Being so restless that it is hard to sit still: (P) Not at all  Feeling afraid as if something awful might happen: (P) Not at all  Becoming easily annoyed or irritable: (P) Not at all  BOSSMAN 7 Total Score: (P) 7  If you checked any problems, how difficult have these problems made it for you to do your work, take care of things at home, or get along with other people: (P) Somewhat difficult     Mental Status Exam:   Hygiene:   good  Cooperation:  Cooperative  Eye Contact:  Good  Psychomotor Behavior:  Appropriate  Affect:  Blunted  Mood: depressed  Hopelessness: Denies  Speech:  Normal  Thought Process:  Goal directed and Linear  Thought  Content:  Normal  Suicidal:  None  Homicidal:  None  Hallucinations:  None  Delusion:  None  Memory:  Intact  Orientation:  Person, Place, Time and Situation  Reliability:  good  Insight:  Good  Judgement:  Good  Impulse Control:  Good  Physical/Medical Issues:  No        Lab Results:   Lab on 05/03/2023   Component Date Value Ref Range Status   • Magnesium 05/03/2023 2.0  1.6 - 2.6 mg/dL Final   • Vitamin B-12 05/03/2023 345  211 - 946 pg/mL Final   • 25 Hydroxy, Vitamin D 05/03/2023 33.8  30.0 - 100.0 ng/ml Final   • TSH 05/03/2023 0.823  0.270 - 4.200 uIU/mL Final   • Glucose 05/03/2023 103 (H)  65 - 99 mg/dL Final   • BUN 05/03/2023 10  6 - 20 mg/dL Final   • Creatinine 05/03/2023 0.79  0.57 - 1.00 mg/dL Final   • Sodium 05/03/2023 139  136 - 145 mmol/L Final   • Potassium 05/03/2023 4.4  3.5 - 5.2 mmol/L Final   • Chloride 05/03/2023 105  98 - 107 mmol/L Final   • CO2 05/03/2023 23.1  22.0 - 29.0 mmol/L Final   • Calcium 05/03/2023 9.6  8.6 - 10.5 mg/dL Final   • Total Protein 05/03/2023 7.2  6.0 - 8.5 g/dL Final   • Albumin 05/03/2023 4.4  3.5 - 5.2 g/dL Final   • ALT (SGPT) 05/03/2023 37 (H)  1 - 33 U/L Final   • AST (SGOT) 05/03/2023 31  1 - 32 U/L Final   • Alkaline Phosphatase 05/03/2023 72  39 - 117 U/L Final   • Total Bilirubin 05/03/2023 0.2  0.0 - 1.2 mg/dL Final   • Globulin 05/03/2023 2.8  gm/dL Final   • A/G Ratio 05/03/2023 1.6  g/dL Final   • BUN/Creatinine Ratio 05/03/2023 12.7  7.0 - 25.0 Final   • Anion Gap 05/03/2023 10.9  5.0 - 15.0 mmol/L Final   • eGFR 05/03/2023 104.6  >60.0 mL/min/1.73 Final   • WBC 05/03/2023 6.49  3.40 - 10.80 10*3/mm3 Final   • RBC 05/03/2023 4.77  3.77 - 5.28 10*6/mm3 Final   • Hemoglobin 05/03/2023 14.1  12.0 - 15.9 g/dL Final   • Hematocrit 05/03/2023 40.3  34.0 - 46.6 % Final   • MCV 05/03/2023 84.5  79.0 - 97.0 fL Final   • MCH 05/03/2023 29.6  26.6 - 33.0 pg Final   • MCHC 05/03/2023 35.0  31.5 - 35.7 g/dL Final   • RDW 05/03/2023 12.1 (L)  12.3 - 15.4 % Final    • RDW-SD 05/03/2023 37.3  37.0 - 54.0 fl Final   • MPV 05/03/2023 10.8  6.0 - 12.0 fL Final   • Platelets 05/03/2023 295  140 - 450 10*3/mm3 Final   • Neutrophil % 05/03/2023 38.8 (L)  42.7 - 76.0 % Final   • Lymphocyte % 05/03/2023 46.7 (H)  19.6 - 45.3 % Final   • Monocyte % 05/03/2023 11.1  5.0 - 12.0 % Final   • Eosinophil % 05/03/2023 2.6  0.3 - 6.2 % Final   • Basophil % 05/03/2023 0.6  0.0 - 1.5 % Final   • Immature Grans % 05/03/2023 0.2  0.0 - 0.5 % Final   • Neutrophils, Absolute 05/03/2023 2.52  1.70 - 7.00 10*3/mm3 Final   • Lymphocytes, Absolute 05/03/2023 3.03  0.70 - 3.10 10*3/mm3 Final   • Monocytes, Absolute 05/03/2023 0.72  0.10 - 0.90 10*3/mm3 Final   • Eosinophils, Absolute 05/03/2023 0.17  0.00 - 0.40 10*3/mm3 Final   • Basophils, Absolute 05/03/2023 0.04  0.00 - 0.20 10*3/mm3 Final   • Immature Grans, Absolute 05/03/2023 0.01  0.00 - 0.05 10*3/mm3 Final   • nRBC 05/03/2023 0.0  0.0 - 0.2 /100 WBC Final         Assessment & Plan   Assessment     ICD-10-CM ICD-9-CM   1. Attention deficit disorder (ADD) in adult  F98.8 314.00   2. Mild episode of recurrent major depressive disorder  F33.0 296.31   3. Generalized anxiety disorder  F41.1 300.02         Decreased Qelbree to 400 mg daily   Continue Prozac 20 mg daily    Follow-up in 5 weeks or sooner if needed  Gene site testing reviewed and appreciated    Previously discussed [facility] docket [id number] with the patient and educated her that should she require stimulant therapy in the future, her PCP would need to refer her back to an person psychiatric care for medication management for ADD.    SAFETY PLAN  Patient agrees to call 911/go to the nearest emergency department if he/she develops new or worsening SI, or develops intent or plan to act on these thoughts. Patient is agreeable to all elements of safety plan and verbalizes understanding.    GOALS:  Short Term Goals: Patient will be compliant with medication, and patient will have no significant  medication related side effects.  Patient will be engaged in psychotherapy as indicated.  Patient will report subjective improvement of symptoms.  Long term goals: To stabilize mood and treat/improve subjective symptoms, the patient will stay out of the hospital, the patient will be at an optimal level of functioning, and the patient will take all medications as prescribed.  The patient/guardian verbalized understanding and agreement with goals that were mutually set.      TREATMENT PLAN: Continue supportive psychotherapy efforts and medications as indicated.  Pharmacological and Non-Pharmacological treatment options discussed during today's visit. Patient/Guardian acknowledged and verbally consented with current treatment plan and was educated on the importance of compliance with treatment and follow-up appointments.      MEDICATION ISSUES:  Discussed medication options and treatment plan of prescribed medication as well as the risks, benefits, any black box warnings, and side effects including potential falls, possible impaired driving, and metabolic adversities among others. Patient is agreeable to call the office with any worsening of symptoms or onset of side effects, or if any concerns or questions arise.  The contact information for the office is made available to the patient. Patient is agreeable to call 911 or go to the nearest ER should they begin having any SI/HI, or if any urgent concerns arise. No medication side effects or related complaints today.     Medication Changes During Visit:   There are no discontinued medications.   No orders of the defined types were placed in this encounter.      Follow Up Appointment:   Return in about 5 weeks (around 6/12/2023) for Recheck.           This document has been electronically signed by YEE Shelby  May 8, 2023 08:21 EDT    Dictated Utilizing Dragon Dictation: Part of this note may be an electronic transcription/translation of spoken language to  printed text using the Dragon Dictation System.

## 2023-05-10 DIAGNOSIS — F32.1 MODERATE MAJOR DEPRESSION: ICD-10-CM

## 2023-05-10 DIAGNOSIS — F41.9 ANXIETY: ICD-10-CM

## 2023-05-12 RX ORDER — ESCITALOPRAM OXALATE 10 MG/1
TABLET ORAL
Qty: 30 TABLET | Refills: 1 | OUTPATIENT
Start: 2023-05-12

## 2023-05-26 ENCOUNTER — OFFICE VISIT (OUTPATIENT)
Dept: INTERNAL MEDICINE | Facility: CLINIC | Age: 28
End: 2023-05-26
Payer: COMMERCIAL

## 2023-05-26 ENCOUNTER — LAB (OUTPATIENT)
Dept: INTERNAL MEDICINE | Facility: CLINIC | Age: 28
End: 2023-05-26
Payer: COMMERCIAL

## 2023-05-26 VITALS
HEART RATE: 92 BPM | OXYGEN SATURATION: 98 % | RESPIRATION RATE: 18 BRPM | BODY MASS INDEX: 33.64 KG/M2 | DIASTOLIC BLOOD PRESSURE: 62 MMHG | TEMPERATURE: 97.8 F | SYSTOLIC BLOOD PRESSURE: 104 MMHG | WEIGHT: 178.2 LBS | HEIGHT: 61 IN

## 2023-05-26 DIAGNOSIS — Z11.3 SCREEN FOR STD (SEXUALLY TRANSMITTED DISEASE): ICD-10-CM

## 2023-05-26 DIAGNOSIS — Z00.00 ANNUAL PHYSICAL EXAM: Primary | ICD-10-CM

## 2023-05-26 DIAGNOSIS — E66.9 OBESITY (BMI 30.0-34.9): ICD-10-CM

## 2023-05-26 DIAGNOSIS — F41.9 ANXIETY: ICD-10-CM

## 2023-05-26 DIAGNOSIS — R79.89 ELEVATED LFTS: ICD-10-CM

## 2023-05-26 DIAGNOSIS — F32.1 MODERATE MAJOR DEPRESSION: ICD-10-CM

## 2023-05-26 DIAGNOSIS — Z23 NEED FOR COVID-19 VACCINE: ICD-10-CM

## 2023-05-26 DIAGNOSIS — Z00.00 ANNUAL PHYSICAL EXAM: ICD-10-CM

## 2023-05-26 LAB
CHOLEST SERPL-MCNC: 159 MG/DL (ref 0–200)
HAV IGM SERPL QL IA: NORMAL
HBV CORE IGM SERPL QL IA: NORMAL
HBV SURFACE AG SERPL QL IA: NORMAL
HCV AB SER DONR QL: NORMAL
HDLC SERPL-MCNC: 39 MG/DL (ref 40–60)
HIV1+2 AB SER QL: NORMAL
LDLC SERPL CALC-MCNC: 108 MG/DL (ref 0–100)
LDLC/HDLC SERPL: 2.76 {RATIO}
RPR SER QL: NORMAL
TRIGL SERPL-MCNC: 61 MG/DL (ref 0–150)
VLDLC SERPL-MCNC: 12 MG/DL (ref 5–40)

## 2023-05-26 PROCEDURE — 87591 N.GONORRHOEAE DNA AMP PROB: CPT | Performed by: NURSE PRACTITIONER

## 2023-05-26 PROCEDURE — G0432 EIA HIV-1/HIV-2 SCREEN: HCPCS | Performed by: NURSE PRACTITIONER

## 2023-05-26 PROCEDURE — 80061 LIPID PANEL: CPT | Performed by: NURSE PRACTITIONER

## 2023-05-26 PROCEDURE — 87491 CHLMYD TRACH DNA AMP PROBE: CPT | Performed by: NURSE PRACTITIONER

## 2023-05-26 PROCEDURE — 86695 HERPES SIMPLEX TYPE 1 TEST: CPT | Performed by: NURSE PRACTITIONER

## 2023-05-26 PROCEDURE — 86696 HERPES SIMPLEX TYPE 2 TEST: CPT | Performed by: NURSE PRACTITIONER

## 2023-05-26 PROCEDURE — 86592 SYPHILIS TEST NON-TREP QUAL: CPT | Performed by: NURSE PRACTITIONER

## 2023-05-26 PROCEDURE — 87661 TRICHOMONAS VAGINALIS AMPLIF: CPT | Performed by: NURSE PRACTITIONER

## 2023-05-26 PROCEDURE — 36415 COLL VENOUS BLD VENIPUNCTURE: CPT | Performed by: NURSE PRACTITIONER

## 2023-05-26 PROCEDURE — 80074 ACUTE HEPATITIS PANEL: CPT | Performed by: NURSE PRACTITIONER

## 2023-05-26 RX ORDER — VILOXAZINE HYDROCHLORIDE 200 MG/1
400 CAPSULE, EXTENDED RELEASE ORAL DAILY
COMMUNITY

## 2023-05-26 RX ORDER — HYDROXYZINE PAMOATE 25 MG/1
CAPSULE ORAL
Qty: 90 CAPSULE | Refills: 1 | Status: SHIPPED | OUTPATIENT
Start: 2023-05-26

## 2023-05-26 RX ORDER — FLUOXETINE HYDROCHLORIDE 20 MG/1
20 CAPSULE ORAL DAILY
Qty: 30 CAPSULE | Refills: 6 | Status: SHIPPED | OUTPATIENT
Start: 2023-05-26

## 2023-05-26 NOTE — PROGRESS NOTES
"  Patient Care Team:  Milagro Wallis APRN as PCP - General (Nurse Practitioner)  Gabbi Ramírez DO (Inactive) as Consulting Physician (Obstetrics and Gynecology)  Katherin Harkins APRN as Nurse Practitioner (Behavioral Health)     Chief Complaint   Patient presents with   • Annual Exam     Pt is fasting, no pap, would like a referral to OBGYN           Patient is a 28 y.o. female who presents for her yearly physical exam.     HPI     Anxiety/depression  The patient is doing well today. She reports her medications are working; although she has been experiencing some side effects. She notes she feels sleep deprived secondary to the medication keeps her more alert. She reports she takes her medication in the mornings. She states she was on 600mg of qelbree; however, her provider changed the dosage to increase from 200mg for 1 week, 400mg for 2 weeks and then down to 300mg. She adds it was when she was on the 300mg she was unable to sleep. She notes she was put back on the 400mg and her sleep is getting better. She is working with psych to adjust this  The patient admits she is still on Prozac and she needs a refill.    Gynecology  The patient desires a referral to gynecology for routine care. She does not desire to do a Pap smear today. She notes her menses are regular.     Weight/diet  The patient states she is getting better with diet and exercise. She reports she has not had \"pop\" for approximately 3 weeks. She adds she has been walking more. She notes when she has time in the morning, she walks with her sister.     Allergies  The patient reports she occasionally takes the hydroxyzine; however, she has not had to recently. She denies it making her sleepy. She desires a refill.     Health maintenance  The patient reports she has received 2 Moderna COVID-19 vaccines ( it is shown that she has had 3). She desire the bivalent COVID-19 booster today.   She reports she received tetanus and influenza " vaccinations on her job.   She  reports she is up-to-date on her hepatitis A and B vaccines.     She inquires how often are STD screening done. She notes she has had a previous STD screening. She admits to having had a new partner; however, she uses protection. She desire STD screening today. She acknowledges having herpes simplex 1; although, she has not had an outbreak in years.    She reports her other provider ordered lab work.     Dental  She reports jaw clicking. She states her dentist was going to get her a mouthguard; however, she has not gone back to do that. She plans on doing so soon.      Health maintenance/lifestyle:  Health Maintenance   Topic Date Due   • PAP SMEAR  01/17/2021   • COVID-19 Vaccine (4 - Booster for Pfizer series) 06/19/2021   • ANNUAL PHYSICAL  05/18/2023   • INFLUENZA VACCINE  08/01/2023   • TDAP/TD VACCINES (2 - Td or Tdap) 07/26/2029   • HEPATITIS C SCREENING  Completed   • HEPATITIS A VACCINE ADULT  Completed   • Pneumococcal Vaccine 0-64  Aged Out     Immunization History   Administered Date(s) Administered   • COVID-19 (MODERNA) 1st,2nd,3rd Dose Monovalent 04/24/2021   • COVID-19 (PFIZER) Purple Cap Monovalent 02/19/2021, 03/17/2021   • Hepatitis A 07/26/2019, 05/18/2022   • Hpv9 05/01/2021   • Influenza, Unspecified 10/30/2021, 10/28/2022   • Tdap 07/26/2019     Cancer-related family history includes Pancreatic cancer in her maternal uncle. There is no history of Breast cancer, Cervical cancer, Lung cancer, Ovarian cancer, or Colon cancer.   reports that she is not currently sexually active and has had partner(s) who are male. She reports using the following method of birth control/protection: Condom.  Social History     Tobacco Use   Smoking Status Never   Smokeless Tobacco Never     Social History     Substance and Sexual Activity   Alcohol Use Yes   • Alcohol/week: 1.0 standard drink   • Types: 1 Cans of beer per week    Comment: Social         PHQ-2 Depression  Screening  Little interest or pleasure in doing things? 0-->not at all   Feeling down, depressed, or hopeless? 0-->not at all   PHQ-2 Total Score 0         Review of Systems   Constitutional: Negative for fatigue, fever and unexpected weight loss.   HENT: Positive for dental problem.    Eyes: Negative for blurred vision, double vision and visual disturbance.   Respiratory: Negative for cough, shortness of breath and wheezing.    Cardiovascular: Negative for chest pain, palpitations and leg swelling.   Gastrointestinal: Negative for abdominal pain, constipation, diarrhea, nausea and vomiting.   Genitourinary: Negative for difficulty urinating, frequency and urgency.   Musculoskeletal: Negative for arthralgias and myalgias.   Skin: Negative for color change and rash.   Neurological: Negative for dizziness, weakness and headache.   Hematological: Negative for adenopathy. Does not bruise/bleed easily.   Psychiatric/Behavioral: Positive for sleep disturbance.         History  Social History     Socioeconomic History   • Marital status: Single   • Number of children: 0   • Highest education level: Some college, no degree   Tobacco Use   • Smoking status: Never   • Smokeless tobacco: Never   Vaping Use   • Vaping Use: Never used   Substance and Sexual Activity   • Alcohol use: Yes     Alcohol/week: 1.0 standard drink     Types: 1 Cans of beer per week     Comment: Social   • Drug use: No   • Sexual activity: Not Currently     Partners: Male     Birth control/protection: Condom     Past Medical History:   Diagnosis Date   • Abnormal Pap smear of cervix 07/26/2019    LSIL   • Anxiety    • COVID 2020 2020 and 2022   • Depression    • Nexplanon in placed on 10/25/2019 10/25/2019    Removed 2/2021   • Strep throat       Past Surgical History:   Procedure Laterality Date   • TONSILLECTOMY  2019      No Known Allergies   Family History   Problem Relation Age of Onset   • Hyperlipidemia Mother    • No Known Problems Father    •  "Cirrhosis Maternal Uncle         alcohol   • Pancreatic cancer Maternal Uncle    • Hypertension Maternal Grandmother    • Stroke Neg Hx    • Heart attack Neg Hx    • Breast cancer Neg Hx    • Cervical cancer Neg Hx    • Lung cancer Neg Hx    • Ovarian cancer Neg Hx    • Colon cancer Neg Hx        Current Outpatient Medications:   •  FLUoxetine (PROzac) 20 MG capsule, Take 1 capsule by mouth Daily., Disp: 30 capsule, Rfl: 6  •  hydrOXYzine pamoate (VISTARIL) 25 MG capsule, Take 1-2 pills three times a day if needed for anxiety, Disp: 90 capsule, Rfl: 1  •  MAG THREONATE-NIACINAMIDE ER PO, Take 2 capsules by mouth Daily., Disp: , Rfl:   •  Omega-3 Fatty Acids (FISH OIL PO), Take  by mouth., Disp: , Rfl:   •  ondansetron ODT (ZOFRAN-ODT) 4 MG disintegrating tablet, Place 1 tablet on the tongue Daily As Needed for Nausea (drug induced nausea) for up to 30 days., Disp: 30 tablet, Rfl: 0  •  vitamin D3 125 MCG (5000 UT) capsule capsule, Take 1 capsule by mouth Daily., Disp: , Rfl:   •  Viloxazine HCl ER (Qelbree) 200 MG capsule sustained-release 24 hr, Take 2 capsules by mouth Daily., Disp: , Rfl:                   /62 (BP Location: Right arm, Patient Position: Sitting, Cuff Size: Adult)   Pulse 92   Temp 97.8 °F (36.6 °C) (Infrared)   Resp 18   Ht 155.6 cm (61.25\")   Wt 80.8 kg (178 lb 3.2 oz)   SpO2 98%   BMI 33.40 kg/m²       Physical Exam  Vitals and nursing note reviewed.   Constitutional:       General: She is not in acute distress.     Appearance: Normal appearance. She is well-developed. She is obese. She is not diaphoretic.   HENT:      Head: Normocephalic and atraumatic.      Right Ear: External ear normal.      Left Ear: External ear normal.      Nose: Nose normal.   Eyes:      General: No scleral icterus.        Right eye: No discharge.         Left eye: No discharge.      Conjunctiva/sclera: Conjunctivae normal.      Pupils: Pupils are equal, round, and reactive to light.   Neck:      Thyroid: No " thyromegaly.      Vascular: No carotid bruit or JVD.      Trachea: No tracheal deviation.   Cardiovascular:      Rate and Rhythm: Normal rate and regular rhythm.      Heart sounds: Normal heart sounds. No murmur heard.    No friction rub. No gallop.   Pulmonary:      Effort: Pulmonary effort is normal. No respiratory distress.      Breath sounds: Normal breath sounds. No wheezing or rales.   Chest:      Chest wall: No tenderness.   Breasts:     Breasts are symmetrical.      Right: No inverted nipple, mass, nipple discharge, skin change or tenderness.      Left: No inverted nipple, mass, nipple discharge, skin change or tenderness.   Abdominal:      General: Bowel sounds are normal. There is no distension.      Palpations: Abdomen is soft. There is no mass.      Tenderness: There is no abdominal tenderness.      Hernia: No hernia is present.   Musculoskeletal:         General: No tenderness or deformity. Normal range of motion.      Cervical back: Normal range of motion and neck supple.   Lymphadenopathy:      Head:      Right side of head: No submental, submandibular, tonsillar, preauricular, posterior auricular or occipital adenopathy.      Left side of head: No submental, submandibular, tonsillar, preauricular, posterior auricular or occipital adenopathy.      Cervical: No cervical adenopathy.   Skin:     General: Skin is warm and dry.      Coloration: Skin is not pale.      Findings: No erythema or rash.   Neurological:      General: No focal deficit present.      Mental Status: She is alert and oriented to person, place, and time.   Psychiatric:         Behavior: Behavior normal. Behavior is cooperative.         Thought Content: Thought content normal.                   Diagnoses and all orders for this visit:    1. Annual physical exam (Primary)  -     Ambulatory Referral to Gynecology  -     Lipid Panel; Future    2. Anxiety  -     FLUoxetine (PROzac) 20 MG capsule; Take 1 capsule by mouth Daily.  Dispense: 30  capsule; Refill: 6  -     hydrOXYzine pamoate (VISTARIL) 25 MG capsule; Take 1-2 pills three times a day if needed for anxiety  Dispense: 90 capsule; Refill: 1    3. Moderate major depression  -     FLUoxetine (PROzac) 20 MG capsule; Take 1 capsule by mouth Daily.  Dispense: 30 capsule; Refill: 6  -     hydrOXYzine pamoate (VISTARIL) 25 MG capsule; Take 1-2 pills three times a day if needed for anxiety  Dispense: 90 capsule; Refill: 1    4. Obesity (BMI 30.0-34.9)  -     Lipid Panel; Future    5. Elevated LFTs    6. Screen for STD (sexually transmitted disease)  -     Chlamydia trachomatis, Neisseria gonorrhoeae, Trichomonas vaginalis, PCR - Urine, Urine, Clean Catch; Future  -     Hepatitis Panel, Acute; Future  -     HIV-1 / O / 2 Ag / Antibody 4th Generation; Future  -     HSV 1 & 2 - Specific Antibody, IgG; Future  -     RPR; Future    7. Need for COVID-19 vaccine  -     COVID-19 (Pfizer) Bivalent 12+yrs      Annual exam  -Update COVID booster today  - Gynecology-Will refer patient.  - Advise to continue with self breast exams.    Labs/health maintenance  -Cholesterol panel.  - Elevated liver function test.  - Acute hepatitis panel and will recheck in 2 months.  - Discussed doing an ultrasound of liver if needed.  - STD screening.  - COVID-19 vaccine.    Anxiety/depression  - Will continue fluoxetine.  - Will increase dosage hydroxyzine.  -Continue to follow with psychiatry for management of Qelbree    Counseling on weight  - Encouraged to work on weight loss.  - Advise to get 150 minutes of moderate intensity exercise a week with lots of walking          Follow up: No follow-ups on file.  Plan of care discussed with patient. They verbalized understanding and agreement.     Electronically signed by : YEE Saavedra    5/26/2023   09:05 EDT          Transcribed from ambient dictation for YEE Saavedra by Elisa Simon.  05/26/23   12:21 EDT    Patient or patient representative verbalized consent  to the visit recording.  I have personally performed the services described in this document as transcribed by the above individual, and it is both accurate and complete.  Milagro Wallis, APRN  5/26/2023  12:49 EDT

## 2023-05-27 LAB
HSV1 IGG SER IA-ACNC: 53.3 INDEX (ref 0–0.9)
HSV2 IGG SER IA-ACNC: <0.91 INDEX (ref 0–0.9)

## 2023-05-30 ENCOUNTER — TELEPHONE (OUTPATIENT)
Dept: INTERNAL MEDICINE | Facility: CLINIC | Age: 28
End: 2023-05-30

## 2023-05-30 LAB
C TRACH RRNA SPEC QL NAA+PROBE: NEGATIVE
N GONORRHOEA RRNA SPEC QL NAA+PROBE: NEGATIVE
T VAGINALIS RRNA SPEC QL NAA+PROBE: NEGATIVE

## 2023-05-30 RX ORDER — VILOXAZINE HYDROCHLORIDE 200 MG/1
400 CAPSULE, EXTENDED RELEASE ORAL DAILY
Qty: 60 CAPSULE | Refills: 0 | Status: SHIPPED | OUTPATIENT
Start: 2023-05-30

## 2023-05-30 NOTE — TELEPHONE ENCOUNTER
Caller: Lizzie Farrar    Relationship: Self    Best call back number: [phone]    What is the best time to reach you: ANYTIME     Who are you requesting to speak with (clinical staff, provider,  specific staff member): CLINICAL STAFF     What was the call regarding: PATIENT STATES THAT SHE SPOKE WITH THE OFFICE AT HER LAST VISIT THAT SHE WOULD BE RUNNING OUT OF HER BEHAVIORAL HEALTH MEDICATION AND ASKED FOR THE OFFICE TO CONTACT HER TELEMEDICINE PROVIDER BHARATH KRUGER. SHE SAYS THAT SHE IS NOW OUT OF THE MEDICATION AND WOULD LIKE TO KNOW IF THE OFFICE HAS CONTACTED HER.     Is it okay if the provider responds through MyChart: YES

## 2023-06-12 ENCOUNTER — PRIOR AUTHORIZATION (OUTPATIENT)
Dept: PSYCHIATRY | Facility: CLINIC | Age: 28
End: 2023-06-12
Payer: COMMERCIAL

## 2023-06-12 ENCOUNTER — TELEMEDICINE (OUTPATIENT)
Dept: PSYCHIATRY | Facility: CLINIC | Age: 28
End: 2023-06-12
Payer: COMMERCIAL

## 2023-06-12 VITALS — BODY MASS INDEX: 32.8 KG/M2 | WEIGHT: 175 LBS

## 2023-06-12 DIAGNOSIS — F41.1 GENERALIZED ANXIETY DISORDER: ICD-10-CM

## 2023-06-12 DIAGNOSIS — F33.0 MILD EPISODE OF RECURRENT MAJOR DEPRESSIVE DISORDER: ICD-10-CM

## 2023-06-12 DIAGNOSIS — F98.8 ATTENTION DEFICIT DISORDER (ADD) IN ADULT: Primary | ICD-10-CM

## 2023-06-12 DIAGNOSIS — E55.9 VITAMIN D DEFICIENCY: ICD-10-CM

## 2023-06-12 RX ORDER — VILOXAZINE HYDROCHLORIDE 200 MG/1
400 CAPSULE, EXTENDED RELEASE ORAL DAILY
Qty: 60 CAPSULE | Refills: 0 | Status: SHIPPED | OUTPATIENT
Start: 2023-06-12

## 2023-06-12 NOTE — PROGRESS NOTES
This provider is located at the [facility] (through [facility]), [address], [facility], [address] using a secure [employer] Video Visit through [employer]. Patient is being seen remotely via telehealth at their home address [address] [place] [address], and stated they are in a secure environment for this session. The patient's condition being diagnosed/treated is appropriate for telemedicine. The provider identified herself as well as her credentials.   The patient, and/or patients guardian, consent to be seen remotely, and when consent is given they understand that the consent allows for patient identifiable information to be sent to a third party as needed.   They may refuse to be seen remotely at any time. The electronic data is encrypted and password protected, and the patient and/or guardian has been advised of the potential risks to privacy not withstanding such measures.    You have chosen to receive care through a telehealth visit.  Do you consent to use a video/audio connection for your medical care today? Yes    Patient identifiers utilized: Name and date of birth.    Patient verbally confirmed consent for today's encounter:  June 12, 2023    Subjective   Lizzie Farrar is a 28 y.o. female who presents today for follow up    Chief Complaint:    Chief Complaint   Patient presents with    Med Management     ADD, BOSSMAN/depression, vitamin D deficiency        History of Present Illness:    History of Present Illness  Lizzie is a 28-year-old female seen in follow up for outpatient behavioral health services. She is being treated by this provider for ADD.    She is currently on Qelbree 400 mg.     Current symptom burden:   Sleep: improved, averaging 6 hours  Appetite: better, states she feels her appetite is normal at this time and has lost some weight  Anxiety: Patient states that she has had occasional bouts of anxiety, but utilizes hydroxyzine for  "this  Depression: Patient states that her depression has ceased and she has had no self harm thoughts  states her family and coworkers have noticed a positive difference in her activity level and her socialization.  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  Patient reports she is functioning \"so much better\", completing many projects      Original symptom burden:  Sleep: sometimes \"sleeps too much\", \"sometimes doesn't sleep at all\".  Appetite: only eats when she gets physically hungry, forgets to eat sometimes, and eats a lot of junk food  Anxiety: nausea, vomiting when anxious  Paranoia: denies  Hallucinations: denies  Mood fluctuations: feels this way all the time; feels better at the beginning of her period, the downs last approximately a day at most.  She reports she has always had poor memory with trouble concentrating.           The following portions of the patient's history were reviewed and updated as appropriate: allergies, current medications, past family history, past medical history, past social history, past surgical history and problem list.    Past Psychiatric History:  Began Treatment: A few years ago  Diagnoses:Depression, Anxiety and Was also told by previous provider they were concerned she had borderline personality disorder but she states this was never assigned to her as a diagnosis officially.  She does not have symptoms consistent with that diagnosis during my interview with the patient.  She is educated that should this diagnosis be suspected in the future she will be referred to psychology for full psychological evaluation.  She verbalizes understanding.  Psychiatrist: In the past but not currently  Therapist: Before COVID  Admission History:Denies  Medication Trials: abilify, prozac, buspar, wellbutrin, zoloft, lexapro, Qelbree  Self Harm: Denies  Suicide Attempts:Denies   Psychosis, Anxiety, Depression:  Not " applicable    Past Medical History:  Past Medical History:   Diagnosis Date    Abnormal Pap smear of cervix 07/26/2019    LSIL    Anxiety     COVID 2020 2020 and 2022    Depression     Nexplanon in placed on 10/25/2019 10/25/2019    Removed 2/2021    Strep throat        Substance Abuse History:   Types:Denies all, including illicit  Withdrawal Symptoms:Denies  Longest Period Sober:Not Applicable   AA: Not applicable     Social History:  Social History     Socioeconomic History    Marital status: Single    Number of children: 0    Highest education level: Some college, no degree   Tobacco Use    Smoking status: Never    Smokeless tobacco: Never   Vaping Use    Vaping Use: Never used   Substance and Sexual Activity    Alcohol use: Yes     Alcohol/week: 1.0 standard drink     Types: 1 Cans of beer per week     Comment: Social    Drug use: No    Sexual activity: Not Currently     Partners: Male     Birth control/protection: Condom       Family History:  Family History   Problem Relation Age of Onset    Hyperlipidemia Mother     No Known Problems Father     Cirrhosis Maternal Uncle         alcohol    Pancreatic cancer Maternal Uncle     Hypertension Maternal Grandmother     Stroke Neg Hx     Heart attack Neg Hx     Breast cancer Neg Hx     Cervical cancer Neg Hx     Lung cancer Neg Hx     Ovarian cancer Neg Hx     Colon cancer Neg Hx        Past Surgical History:  Past Surgical History:   Procedure Laterality Date    TONSILLECTOMY  2019       Problem List:  Patient Active Problem List   Diagnosis    Tonsillar hypertrophy    Vitamin D deficiency    Anxiety    Moderate major depression    Obesity (BMI 30.0-34.9)       Allergy:   No Known Allergies     Current Medications:   Current Outpatient Medications   Medication Sig Dispense Refill    FLUoxetine (PROzac) 20 MG capsule Take 1 capsule by mouth Daily. 30 capsule 6    hydrOXYzine pamoate (VISTARIL) 25 MG capsule Take 1-2 pills three times a day if needed for anxiety 90  capsule 1    MAG THREONATE-NIACINAMIDE ER PO Take 2 capsules by mouth Daily.      Multiple Vitamins-Minerals (b complex-C-E-zinc) tablet Take 1 tablet by mouth Daily.      Omega-3 Fatty Acids (FISH OIL PO) Take  by mouth.      Viloxazine HCl ER (Qelbree) 200 MG capsule sustained-release 24 hr Take 2 capsules by mouth Daily. 60 capsule 0    vitamin D3 125 MCG (5000 UT) capsule capsule Take 1 capsule by mouth Daily.       No current facility-administered medications for this visit.       Review of Systems:    Review of Systems   Psychiatric/Behavioral: Negative.     All other systems reviewed and are negative.      Physical Exam:   Physical Exam  Constitutional:       General: She is awake.      Appearance: Normal appearance. She is well-developed and well-groomed.   Neurological:      Mental Status: She is alert and oriented to person, place, and time.   Psychiatric:         Attention and Perception: Attention and perception normal.         Mood and Affect: Mood and affect normal.         Speech: Speech normal.         Behavior: Behavior normal. Behavior is cooperative.         Thought Content: Thought content normal.         Cognition and Memory: Cognition and memory normal.         Judgment: Judgment normal.       Vitals:  Weight 79.4 kg (175 lb), not currently breastfeeding. Body mass index is 32.8 kg/m².  Due to extenuating circumstances and possible current health risks associated with the patient being present in a clinical setting (with current health restrictions in place in regards to possible COVID 19 transmission/exposure), the patient was seen remotely today via a MyChart Video Visit through [facility].  Unable to obtain vital signs due to nature of remote visit.  Height stated at 61 inches.  Weight stated at 175 pounds.    Last 3 Blood Pressure Readings:  BP Readings from Last 3 Encounters:   05/26/23 104/62   04/12/23 102/64   03/06/23 106/68       PHQ-9 Score:   PHQ-9 Total Score:      BOSSMAN-7 Score:   Feeling  nervous, anxious or on edge: (P) Not at all  Not being able to stop or control worrying: (P) Not at all  Worrying too much about different things: (P) Not at all  Trouble Relaxing: (P) Several days  Being so restless that it is hard to sit still: (P) Not at all  Feeling afraid as if something awful might happen: (P) Not at all  Becoming easily annoyed or irritable: (P) Not at all  BOSSMAN 7 Total Score: (P) 1  If you checked any problems, how difficult have these problems made it for you to do your work, take care of things at home, or get along with other people: (P) Not difficult at all     Mental Status Exam:   Hygiene:   good  Cooperation:  Cooperative  Eye Contact:  Good  Psychomotor Behavior:  Appropriate  Affect:  Blunted  Mood: depressed  Hopelessness: Denies  Speech:  Normal  Thought Process:  Goal directed and Linear  Thought Content:  Normal  Suicidal:  None  Homicidal:  None  Hallucinations:  None  Delusion:  None  Memory:  Intact  Orientation:  Person, Place, Time and Situation  Reliability:  good  Insight:  Good  Judgement:  Good  Impulse Control:  Good  Physical/Medical Issues:  No        Lab Results:   Lab on 05/26/2023   Component Date Value Ref Range Status    Hepatitis B Surface Ag 05/26/2023 Non-Reactive  Non-Reactive Final    Hep A IgM 05/26/2023 Non-Reactive  Non-Reactive Final    Hep B C IgM 05/26/2023 Non-Reactive  Non-Reactive Final    Hepatitis C Ab 05/26/2023 Non-Reactive  Non-Reactive Final    HIV-1/ HIV-2 05/26/2023 Non-Reactive  Non-Reactive Final    A non-reactive test result does not preclude the possibility of exposure to HIV or infection with HIV. An antibody response to recent exposure may take several months to reach detectable levels.    HSV 1 IgG, Type Specific 05/26/2023 53.30 (H)  0.00 - 0.90 index Final                                     Negative        <0.91                                   Equivocal 0.91 - 1.09                                   Positive        >1.09   Note:  Negative indicates no antibodies detected to   HSV-1. Equivocal may suggest early infection.  If   clinically appropriate, retest at later date. Positive   indicates antibodies detected to HSV-1.    HSV 2 IgG 05/26/2023 <0.91  0.00 - 0.90 index Final                                     Negative        <0.91                                   Equivocal 0.91 - 1.09                                   Positive        >1.09   Note: Negative indicates no HSV-2 antibodies detected.   Positive indicates HSV-2 antibodies detected.   Equivocal and low positive HSV-2 screens   (Index 0.91-5.00) may be false positive and are   reflexed to supplemental testing in accordance with   CDC guidelines.    RPR 05/26/2023 Non-Reactive  Non-Reactive Final    Total Cholesterol 05/26/2023 159  0 - 200 mg/dL Final    Triglycerides 05/26/2023 61  0 - 150 mg/dL Final    HDL Cholesterol 05/26/2023 39 (L)  40 - 60 mg/dL Final    LDL Cholesterol  05/26/2023 108 (H)  0 - 100 mg/dL Final    VLDL Cholesterol 05/26/2023 12  5 - 40 mg/dL Final    LDL/HDL Ratio 05/26/2023 2.76   Final    Chlamydia trachomatis, AGATHA 05/26/2023 Negative  Negative Final    Gonococcus by AGATHA 05/26/2023 Negative  Negative Final    Trichomonas vaginosis 05/26/2023 Negative  Negative Final   Lab on 05/03/2023   Component Date Value Ref Range Status    Magnesium 05/03/2023 2.0  1.6 - 2.6 mg/dL Final    Vitamin B-12 05/03/2023 345  211 - 946 pg/mL Final    25 Hydroxy, Vitamin D 05/03/2023 33.8  30.0 - 100.0 ng/ml Final    TSH 05/03/2023 0.823  0.270 - 4.200 uIU/mL Final    Glucose 05/03/2023 103 (H)  65 - 99 mg/dL Final    BUN 05/03/2023 10  6 - 20 mg/dL Final    Creatinine 05/03/2023 0.79  0.57 - 1.00 mg/dL Final    Sodium 05/03/2023 139  136 - 145 mmol/L Final    Potassium 05/03/2023 4.4  3.5 - 5.2 mmol/L Final    Chloride 05/03/2023 105  98 - 107 mmol/L Final    CO2 05/03/2023 23.1  22.0 - 29.0 mmol/L Final    Calcium 05/03/2023 9.6  8.6 - 10.5 mg/dL Final    Total Protein  05/03/2023 7.2  6.0 - 8.5 g/dL Final    Albumin 05/03/2023 4.4  3.5 - 5.2 g/dL Final    ALT (SGPT) 05/03/2023 37 (H)  1 - 33 U/L Final    AST (SGOT) 05/03/2023 31  1 - 32 U/L Final    Alkaline Phosphatase 05/03/2023 72  39 - 117 U/L Final    Total Bilirubin 05/03/2023 0.2  0.0 - 1.2 mg/dL Final    Globulin 05/03/2023 2.8  gm/dL Final    A/G Ratio 05/03/2023 1.6  g/dL Final    BUN/Creatinine Ratio 05/03/2023 12.7  7.0 - 25.0 Final    Anion Gap 05/03/2023 10.9  5.0 - 15.0 mmol/L Final    eGFR 05/03/2023 104.6  >60.0 mL/min/1.73 Final    WBC 05/03/2023 6.49  3.40 - 10.80 10*3/mm3 Final    RBC 05/03/2023 4.77  3.77 - 5.28 10*6/mm3 Final    Hemoglobin 05/03/2023 14.1  12.0 - 15.9 g/dL Final    Hematocrit 05/03/2023 40.3  34.0 - 46.6 % Final    MCV 05/03/2023 84.5  79.0 - 97.0 fL Final    MCH 05/03/2023 29.6  26.6 - 33.0 pg Final    MCHC 05/03/2023 35.0  31.5 - 35.7 g/dL Final    RDW 05/03/2023 12.1 (L)  12.3 - 15.4 % Final    RDW-SD 05/03/2023 37.3  37.0 - 54.0 fl Final    MPV 05/03/2023 10.8  6.0 - 12.0 fL Final    Platelets 05/03/2023 295  140 - 450 10*3/mm3 Final    Neutrophil % 05/03/2023 38.8 (L)  42.7 - 76.0 % Final    Lymphocyte % 05/03/2023 46.7 (H)  19.6 - 45.3 % Final    Monocyte % 05/03/2023 11.1  5.0 - 12.0 % Final    Eosinophil % 05/03/2023 2.6  0.3 - 6.2 % Final    Basophil % 05/03/2023 0.6  0.0 - 1.5 % Final    Immature Grans % 05/03/2023 0.2  0.0 - 0.5 % Final    Neutrophils, Absolute 05/03/2023 2.52  1.70 - 7.00 10*3/mm3 Final    Lymphocytes, Absolute 05/03/2023 3.03  0.70 - 3.10 10*3/mm3 Final    Monocytes, Absolute 05/03/2023 0.72  0.10 - 0.90 10*3/mm3 Final    Eosinophils, Absolute 05/03/2023 0.17  0.00 - 0.40 10*3/mm3 Final    Basophils, Absolute 05/03/2023 0.04  0.00 - 0.20 10*3/mm3 Final    Immature Grans, Absolute 05/03/2023 0.01  0.00 - 0.05 10*3/mm3 Final    nRBC 05/03/2023 0.0  0.0 - 0.2 /100 WBC Final   Results Encounter on 04/22/2023   Component Date Value Ref Range Status    Methylmalonic  Acid 05/03/2023 108  0 - 378 nmol/L Final         Assessment & Plan   Assessment     ICD-10-CM ICD-9-CM   1. Attention deficit disorder (ADD) in adult  F98.8 314.00   2. Generalized anxiety disorder  F41.1 300.02   3. Mild episode of recurrent major depressive disorder  F33.0 296.31   4. Vitamin D deficiency  E55.9 268.9         Continue Qelbree to 400 mg daily   Continue Prozac 20 mg daily-patient would like to possibly come off Prozac. Will discuss at next visit.     Gene site testing reviewed and appreciated      SAFETY PLAN  Patient agrees to call 911/go to the nearest emergency department if he/she develops new or worsening SI, or develops intent or plan to act on these thoughts. Patient is agreeable to all elements of safety plan and verbalizes understanding.    GOALS:  Short Term Goals: Patient will be compliant with medication, and patient will have no significant medication related side effects.  Patient will be engaged in psychotherapy as indicated.  Patient will report subjective improvement of symptoms.  Long term goals: To stabilize mood and treat/improve subjective symptoms, the patient will stay out of the hospital, the patient will be at an optimal level of functioning, and the patient will take all medications as prescribed.  The patient/guardian verbalized understanding and agreement with goals that were mutually set.      TREATMENT PLAN: Continue supportive psychotherapy efforts and medications as indicated.  Pharmacological and Non-Pharmacological treatment options discussed during today's visit. Patient/Guardian acknowledged and verbally consented with current treatment plan and was educated on the importance of compliance with treatment and follow-up appointments.      MEDICATION ISSUES:  Discussed medication options and treatment plan of prescribed medication as well as the risks, benefits, any black box warnings, and side effects including potential falls, possible impaired driving, and  metabolic adversities among others. Patient is agreeable to call the office with any worsening of symptoms or onset of side effects, or if any concerns or questions arise.  The contact information for the office is made available to the patient. Patient is agreeable to call 911 or go to the nearest ER should they begin having any SI/HI, or if any urgent concerns arise. No medication side effects or related complaints today.     Medication Changes During Visit:   There are no discontinued medications.   No orders of the defined types were placed in this encounter.      Follow Up Appointment:   Return in about 4 weeks (around 7/10/2023) for Recheck.           This document has been electronically signed by YEE Shelby  June 12, 2023 08:43 EDT    Dictated Utilizing Dragon Dictation: Part of this note may be an electronic transcription/translation of spoken language to printed text using the Dragon Dictation System.

## 2023-08-13 DIAGNOSIS — F98.8 ATTENTION DEFICIT DISORDER (ADD) IN ADULT: ICD-10-CM

## 2023-08-14 RX ORDER — VILOXAZINE HYDROCHLORIDE 200 MG/1
400 CAPSULE, EXTENDED RELEASE ORAL DAILY
Qty: 60 CAPSULE | Refills: 0 | Status: SHIPPED | OUTPATIENT
Start: 2023-08-14

## 2023-09-01 ENCOUNTER — OFFICE VISIT (OUTPATIENT)
Dept: INTERNAL MEDICINE | Facility: CLINIC | Age: 28
End: 2023-09-01
Payer: COMMERCIAL

## 2023-09-01 VITALS
OXYGEN SATURATION: 98 % | DIASTOLIC BLOOD PRESSURE: 58 MMHG | SYSTOLIC BLOOD PRESSURE: 102 MMHG | TEMPERATURE: 97.3 F | HEIGHT: 61 IN | HEART RATE: 78 BPM | BODY MASS INDEX: 32.1 KG/M2 | RESPIRATION RATE: 18 BRPM | WEIGHT: 170 LBS

## 2023-09-01 DIAGNOSIS — J02.9 SORE THROAT: ICD-10-CM

## 2023-09-01 DIAGNOSIS — J30.2 SEASONAL ALLERGIC RHINITIS, UNSPECIFIED TRIGGER: ICD-10-CM

## 2023-09-01 DIAGNOSIS — F41.9 ANXIETY: ICD-10-CM

## 2023-09-01 DIAGNOSIS — F32.1 MODERATE MAJOR DEPRESSION: ICD-10-CM

## 2023-09-01 DIAGNOSIS — R05.1 ACUTE COUGH: Primary | ICD-10-CM

## 2023-09-01 DIAGNOSIS — K64.9 HEMORRHOIDS, UNSPECIFIED HEMORRHOID TYPE: ICD-10-CM

## 2023-09-01 LAB
EXPIRATION DATE: NORMAL
EXPIRATION DATE: NORMAL
FLUAV AG UPPER RESP QL IA.RAPID: NOT DETECTED
FLUBV AG UPPER RESP QL IA.RAPID: NOT DETECTED
INTERNAL CONTROL: NORMAL
INTERNAL CONTROL: NORMAL
Lab: NORMAL
Lab: NORMAL
S PYO AG THROAT QL: NEGATIVE
SARS-COV-2 AG UPPER RESP QL IA.RAPID: NOT DETECTED

## 2023-09-01 PROCEDURE — 99214 OFFICE O/P EST MOD 30 MIN: CPT | Performed by: NURSE PRACTITIONER

## 2023-09-01 PROCEDURE — 87880 STREP A ASSAY W/OPTIC: CPT | Performed by: NURSE PRACTITIONER

## 2023-09-01 PROCEDURE — 87428 SARSCOV & INF VIR A&B AG IA: CPT | Performed by: NURSE PRACTITIONER

## 2023-09-01 NOTE — PROGRESS NOTES
Subjective   Lizzie Farrar is a 28 y.o. female.     Chief Complaint   Patient presents with    Cough     Pt believes she has drainage    Generalized Body Aches    Fatigue       PCP: Milagro Wallis APRN    History of Present Illness   She is here for an acute visit. She has a cough, generalized body aches and fatigue. Symptoms have been ongoing for 2 to 3 days.    She reports her symptoms began on 08/29/2023. She advises she does become sick when the season changes. She is not currently taking any over the counter medication. She reports she does drink teas. She notes she takes ibuprofen.     She reports she discontinued the Prozac.  She feels as though she is doing well without it.  Anxiety and depression are stable and she denies any SI/HI    The patient reports she presented to the emergency department at [facility] secondary to hemorrhoids. She advises they referred her to a specialist. She advises she has hard bowel movements when she does not drink plenty of fluids.    The following portions of the patient's history were reviewed and updated as appropriate: allergies, current medications, past family history, past medical history, past social history, past surgical history and problem list.        Review of Systems   Constitutional:  Negative for chills, fatigue, fever and unexpected weight loss.   HENT:  Positive for postnasal drip and sinus pressure. Negative for ear pain and sore throat.    Eyes:  Negative for blurred vision, double vision and visual disturbance.   Respiratory:  Positive for cough. Negative for shortness of breath and wheezing.    Cardiovascular:  Negative for chest pain, palpitations and leg swelling.   Gastrointestinal:  Negative for abdominal pain, constipation, diarrhea, nausea and vomiting.   Genitourinary:  Negative for difficulty urinating, frequency and urgency.   Musculoskeletal:  Negative for arthralgias and myalgias.   Skin:  Negative for color change and rash.    Neurological:  Negative for dizziness, weakness and headache.   Hematological:  Negative for adenopathy. Does not bruise/bleed easily.         No outpatient medications have been marked as taking for the 9/1/23 encounter (Office Visit) with BimalMilagro PRIYAYEE.     No Known Allergies        Objective   Physical Exam  Constitutional:       Appearance: Normal appearance. She is well-developed.   HENT:      Head: Normocephalic and atraumatic.      Ears:      Comments: Fluid behind bilateral ears     Mouth/Throat:      Comments: Cobblestone pattern in the posterior oropharynx  Eyes:      General: No scleral icterus.     Conjunctiva/sclera: Conjunctivae normal.   Cardiovascular:      Rate and Rhythm: Normal rate and regular rhythm.      Heart sounds: Normal heart sounds.   Pulmonary:      Effort: Pulmonary effort is normal. No respiratory distress.      Breath sounds: Normal breath sounds.   Abdominal:      General: Bowel sounds are normal.      Palpations: Abdomen is soft.      Tenderness: There is no abdominal tenderness.   Musculoskeletal:         General: Normal range of motion.      Cervical back: Normal range of motion.      Right lower leg: No edema.      Left lower leg: No edema.   Skin:     General: Skin is warm and dry.   Neurological:      General: No focal deficit present.      Mental Status: She is alert and oriented to person, place, and time.   Psychiatric:         Attention and Perception: Attention normal.         Mood and Affect: Mood and affect normal.         Behavior: Behavior normal. Behavior is cooperative.         Thought Content: Thought content normal.         Cognition and Memory: Cognition normal.         Judgment: Judgment normal.       Vitals:    09/01/23 0916   BP: 102/58   BP Location: Right arm   Patient Position: Sitting   Cuff Size: Adult   Pulse: 78   Resp: 18   Temp: 97.3 °F (36.3 °C)   TempSrc: Infrared   SpO2: 98%   Weight: 77.1 kg (170 lb)  Comment: pt reported   Height: 155.6  "cm (61.25\")     Body mass index is 31.86 kg/m².  Wt Readings from Last 3 Encounters:   09/01/23 77.1 kg (170 lb)   06/12/23 79.4 kg (175 lb)   05/26/23 80.8 kg (178 lb 3.2 oz)             Assessment & Plan   Diagnoses and all orders for this visit:    1. Acute cough (Primary)  -     POCT SARS-CoV-2 Antigen TONG + Flu    2. Sore throat  -     POCT rapid strep A    3. Seasonal allergic rhinitis, unspecified trigger    4. Anxiety    5. Moderate major depression    6. Hemorrhoids, unspecified hemorrhoid type    Acute cough/sore throat/allergies  - Informed COVID-19, influenza, and strep test were negative. Encouraged to drink plenty of fluids. Recommend over-the-counter medications.  Advised fever or worsening symptoms may require an antibiotic and to keep me informed of any changes     Hemorrhoids  - Encouraged to increase fiber in diet. Recommend adding a stool softener.     Anxiety/depression  -Well-controlled without medications at this time.  If symptoms return she will follow-up at that time.    BMI is >= 30 and <35. (Class 1 Obesity). The following options were offered after discussion;: exercise counseling/recommendations and nutrition counseling/recommendations      Return for Annual in 5/2024.    I discussed my findings,recommendations, and plan of care was with the patient. They verbalized understanding and agreement.  Patient was encouraged to keep me informed of any acute changes, lack of improvement, or any new concerning symptoms.     Transcribed from ambient dictation for YEE Saavedra by David Trujillo.  09/01/23   10:47 EDT    Patient or patient representative verbalized consent to the visit recording.  I have personally performed the services described in this document as transcribed by the above individual, and it is both accurate and complete.  YEE Saavedra  9/6/2023  07:33 EDT    "

## 2023-09-20 DIAGNOSIS — F98.8 ATTENTION DEFICIT DISORDER (ADD) IN ADULT: ICD-10-CM

## 2023-09-20 RX ORDER — VILOXAZINE HYDROCHLORIDE 200 MG/1
400 CAPSULE, EXTENDED RELEASE ORAL DAILY
Qty: 60 CAPSULE | Refills: 0 | Status: SHIPPED | OUTPATIENT
Start: 2023-09-20

## 2024-01-09 ENCOUNTER — TELEPHONE (OUTPATIENT)
Dept: INTERNAL MEDICINE | Facility: CLINIC | Age: 29
End: 2024-01-09
Payer: COMMERCIAL

## 2024-01-09 NOTE — TELEPHONE ENCOUNTER
Pam from [employer] is asking for a referral to be sent for OT for pelvic floor on this patient.   She is asking that referral be faxed to  [phone].

## 2024-01-12 ENCOUNTER — OFFICE VISIT (OUTPATIENT)
Dept: INTERNAL MEDICINE | Facility: CLINIC | Age: 29
End: 2024-01-12
Payer: COMMERCIAL

## 2024-01-12 VITALS
HEIGHT: 61 IN | HEART RATE: 90 BPM | BODY MASS INDEX: 33.42 KG/M2 | OXYGEN SATURATION: 99 % | SYSTOLIC BLOOD PRESSURE: 90 MMHG | WEIGHT: 177 LBS | TEMPERATURE: 96.8 F | DIASTOLIC BLOOD PRESSURE: 60 MMHG

## 2024-01-12 DIAGNOSIS — N39.3 STRESS INCONTINENCE: ICD-10-CM

## 2024-01-12 DIAGNOSIS — N94.6 MENSES PAINFUL: Primary | ICD-10-CM

## 2024-01-12 PROCEDURE — 99213 OFFICE O/P EST LOW 20 MIN: CPT | Performed by: NURSE PRACTITIONER

## 2024-01-12 NOTE — PROGRESS NOTES
Subjective   Lizzie Farrar is a 28 y.o. female.     Chief Complaint   Patient presents with    Pelvic Pain     Patient in clinic to get referral for pelvic floor therapy    [facility]        PCP: Milagro Wallis APRN    History of Present Illness   The patient is a 28-year-old female who presents to discuss pelvic pain. She has requested a referral to physical therapy for pelvic floor physical therapy.    The patient states she has been to the doctor several times since she was younger for menstrual cramps and menses. She adds that her symptoms were disregarded, and she was advised to take ibuprofen or to start contraceptives. The patient notes her father would take her out of school when she was younger secondary to her severe menstrual symptoms. She reports she experiences slight urinary stress incontinence with jumping jacks or sneezing. She denies any vaginal discharge, sores, lesions, concerns of STDs, acute pain in the pelvic or vaginal area, or concerns about a possible pregnancy. The patient states her menstrual cycles are regular for the most part; however, they are painful. She does not have any children, and she is not on any birth control.    The patient reports a history of a hard fall onto her buttocks on pavement when she was 11 or 12 years old that has resulted in continued pain since that time.      The following portions of the patient's history were reviewed and updated as appropriate: allergies, current medications, past family history, past medical history, past social history, past surgical history and problem list.        Review of Systems   Constitutional:  Negative for fatigue, fever and unexpected weight loss.   Eyes:  Negative for blurred vision, double vision and visual disturbance.   Respiratory:  Negative for cough, shortness of breath and wheezing.    Cardiovascular:  Negative for chest pain, palpitations and leg swelling.   Gastrointestinal:  Negative for abdominal pain,  constipation, diarrhea, nausea and vomiting.   Genitourinary:  Positive for menstrual problem and urinary incontinence (Stress incontinence.). Negative for difficulty urinating, frequency, genital sores, pelvic pain, urgency, vaginal discharge and vaginal pain.   Musculoskeletal:  Negative for arthralgias and myalgias.   Skin:  Negative for color change and rash.   Neurological:  Negative for dizziness, weakness and headache.   Hematological:  Negative for adenopathy. Does not bruise/bleed easily.           No outpatient medications have been marked as taking for the 1/12/24 encounter (Office Visit) with Milagro Wallis APRN.     No Known Allergies        Objective   Physical Exam  Constitutional:       Appearance: Normal appearance. She is well-developed.   HENT:      Head: Normocephalic and atraumatic.   Eyes:      General: No scleral icterus.     Conjunctiva/sclera: Conjunctivae normal.   Cardiovascular:      Rate and Rhythm: Normal rate and regular rhythm.      Heart sounds: Normal heart sounds.   Pulmonary:      Effort: Pulmonary effort is normal. No respiratory distress.      Breath sounds: Normal breath sounds.   Abdominal:      General: Bowel sounds are normal.      Palpations: Abdomen is soft.      Tenderness: There is no abdominal tenderness.   Musculoskeletal:         General: Normal range of motion.      Cervical back: Normal range of motion.      Right lower leg: No edema.      Left lower leg: No edema.   Skin:     General: Skin is warm and dry.   Neurological:      General: No focal deficit present.      Mental Status: She is alert and oriented to person, place, and time.   Psychiatric:         Attention and Perception: Attention normal.         Mood and Affect: Mood and affect normal.         Behavior: Behavior normal. Behavior is cooperative.         Thought Content: Thought content normal.         Cognition and Memory: Cognition normal.         Judgment: Judgment normal.         Vitals:     "01/12/24 0810   BP: 90/60   Pulse: 90   Temp: 96.8 °F (36 °C)   SpO2: 99%   Weight: 80.3 kg (177 lb)   Height: 155.6 cm (61.26\")     Body mass index is 33.16 kg/m².  Wt Readings from Last 3 Encounters:   01/12/24 80.3 kg (177 lb)   09/01/23 77.1 kg (170 lb)   06/12/23 79.4 kg (175 lb)             Assessment & Plan   Diagnoses and all orders for this visit:    1. Menses painful (Primary)  -     Ambulatory Referral to Physical Therapy Pelvic Floor    2. Stress incontinence  -     Ambulatory Referral to Physical Therapy Pelvic Floor        1. Stress incontinence  - Will refer to pelvic floor physical therapy at her request. The patient will follow up with me as needed.    2. Menstrual pain  - Will refer to pelvic floor physical therapy at her request. The patient will follow up with me as needed.          No follow-ups on file.    I discussed my findings,recommendations, and plan of care was with the patient. They verbalized understanding and agreement.  Patient was encouraged to keep me informed of any acute changes, lack of improvement, or any new concerning symptoms.     Transcribed from ambient dictation for YEE Saavedra by Kitty Patel.  01/12/24   09:04 EST    Patient or patient representative verbalized consent to the visit recording.  I have personally performed the services described in this document as transcribed by the above individual, and it is both accurate and complete.  YEE Saavedra  1/12/2024  10:10 EST    "

## 2024-04-30 PROCEDURE — 87205 SMEAR GRAM STAIN: CPT | Performed by: NURSE PRACTITIONER

## 2024-04-30 PROCEDURE — 87070 CULTURE OTHR SPECIMN AEROBIC: CPT | Performed by: NURSE PRACTITIONER

## 2024-05-01 ENCOUNTER — PATIENT ROUNDING (BHMG ONLY) (OUTPATIENT)
Dept: URGENT CARE | Facility: CLINIC | Age: 29
End: 2024-05-01
Payer: COMMERCIAL

## 2024-05-01 NOTE — ED NOTES
Thank you for letting us care for you in your recent visit to our urgent care center. We would love to hear about your experience with us. Was this the first time you have visited our location?    We’re always looking for ways to make our patients’ experiences even better. Do you have any recommendations on ways we may improve?     I appreciate you taking the time to respond. Please be on the lookout for a survey about your recent visit from [employer] via text or email. We would greatly appreciate if you could fill that out and turn it back in. We want your voice to be heard and we value your feedback.   Thank you for choosing [facility] for your healthcare needs.

## 2024-05-03 ENCOUNTER — OFFICE VISIT (OUTPATIENT)
Dept: INTERNAL MEDICINE | Facility: CLINIC | Age: 29
End: 2024-05-03
Payer: COMMERCIAL

## 2024-05-03 VITALS
HEART RATE: 64 BPM | BODY MASS INDEX: 33.49 KG/M2 | TEMPERATURE: 96.9 F | HEIGHT: 62 IN | RESPIRATION RATE: 16 BRPM | WEIGHT: 182 LBS | DIASTOLIC BLOOD PRESSURE: 74 MMHG | SYSTOLIC BLOOD PRESSURE: 108 MMHG | OXYGEN SATURATION: 98 %

## 2024-05-03 DIAGNOSIS — E66.9 OBESITY (BMI 30.0-34.9): ICD-10-CM

## 2024-05-03 DIAGNOSIS — E55.9 VITAMIN D DEFICIENCY: ICD-10-CM

## 2024-05-03 DIAGNOSIS — N39.3 STRESS INCONTINENCE: ICD-10-CM

## 2024-05-03 DIAGNOSIS — Z00.01 ANNUAL VISIT FOR GENERAL ADULT MEDICAL EXAMINATION WITH ABNORMAL FINDINGS: Primary | ICD-10-CM

## 2024-05-03 DIAGNOSIS — F41.9 ANXIETY: Chronic | ICD-10-CM

## 2024-05-03 PROCEDURE — 99395 PREV VISIT EST AGE 18-39: CPT | Performed by: NURSE PRACTITIONER

## 2024-05-03 NOTE — PROGRESS NOTES
Patient Care Team:  Milagro Wallis APRN as PCP - General (Nurse Practitioner)  Gabbi Ramírez DO (Inactive) as Consulting Physician (Obstetrics and Gynecology)  Katherin Harkins APRN as Nurse Practitioner (Behavioral Health)     Chief Complaint   Patient presents with    Annual Exam     Pt is fasting, pt has upcoming apt with gyn for pap and breast exam                Patient is a 29 y.o. female who presents for her yearly physical exam.     HPI /Review of Systems  History of Present Illness  The patient presents for a physical exam.    The patient has a scheduled appointment with a gynecologist for a Pap smear and breast exam. She attended one session of pelvic floor physical therapy for painful periods and stress incontinence, but due to financial constraints, she has been attempting to perform exercises at home. On 4/30/2023, she sought urgent care for an abscess in the suprapubic area, which has been present for 2 months. The abscess would dry out and recur, prompting a culture test. She was prescribed antibiotics and a cream, which have been effective. The abscess was red and irritated, although she has not observed any ingrown hairs.culture was negative. She reports area is resolved now.    The patient has been diagnosed with ADHD, but is not currently on any medication. She is not currently seeing anyone for ADHD. Her ADHD symptoms have improved significantly, although she occasionally struggles. She engages in meditation and walks for exercise. Her depression screening was negative. She enjoys biking, but her diet is suboptimal. Her physical activity includes walking her dogs and hiking on weekends. She does not consume much calcium or dairy products. Her caffeine intake is minimal. She attempts to maintain a balanced diet, but admits to eating out frequently. She has attempted meal planning, but prefers not to consume the same food twice. She lost 10 pounds in the past, but has since regained  the weight. Her cholesterol levels have consistently been borderline. She denies any concerns about sexually transmitted diseases. She does not take vitamin D supplements. She denies any unusual headaches, dizziness, or chest pain. She denies any heartburn, indigestion, nausea, vomiting, diarrhea, constipation, or blood in her bowel movements. Her menstrual cycles are regular. She denies any allergies. She denies any swelling in her feet or ankles. She does not wear compression stockings at work.    Approximately a week ago, the patient noticed a cyst in the middle of her left eye. She manipulated it with a Q-tip, which successfully drained the cyst. The cyst was not painful and did not notice it during the day. She denies any vision problems. She recently had her vision checked, which did not reveal any abnormalities. The cyst has not caused any discomfort or itching.   The patient denies smoking, using tobacco products, or vaping. She rarely drinks alcohol.   She has received 1 HPV vaccination.        Health maintenance/lifestyle:  Health Maintenance   Topic Date Due    PAP SMEAR  01/17/2021    COVID-19 Vaccine (5 - 2023-24 season) 05/05/2024 (Originally 9/1/2023)    ANNUAL PHYSICAL  05/26/2024    INFLUENZA VACCINE  08/01/2024    BMI FOLLOWUP  01/12/2025    TDAP/TD VACCINES (2 - Td or Tdap) 07/26/2029    HEPATITIS C SCREENING  Completed    HEPATITIS A VACCINE ADULT  Completed    Pneumococcal Vaccine 0-64  Aged Out     Immunization History   Administered Date(s) Administered    COVID-19 (MODERNA) 1st,2nd,3rd Dose Monovalent 04/24/2021    COVID-19 (PFIZER) BIVALENT 12+YRS 05/26/2023    COVID-19 (PFIZER) Purple Cap Monovalent 02/19/2021, 03/17/2021    Hepatitis A 07/26/2019, 05/18/2022    Hpv9 05/01/2021    Influenza, Unspecified 10/30/2021, 10/28/2022    Tdap 07/26/2019      reports that she is not currently sexually active and has had partner(s) who are male. She reports using the following method of birth  control/protection: Condom.  Social History     Tobacco Use   Smoking Status Never   Smokeless Tobacco Never     Social History     Substance and Sexual Activity   Alcohol Use Yes    Alcohol/week: 1.0 standard drink of alcohol    Types: 1 Cans of beer per week    Comment: Social       Results  Laboratory Studies  LDL cholesterol was 108. Vitamin D levels were low.       PHQ-2 Depression Screening  Little interest or pleasure in doing things? 0-->not at all   Feeling down, depressed, or hopeless? 0-->not at all   PHQ-2 Total Score 0           History  Social History     Socioeconomic History    Marital status: Single    Number of children: 0    Highest education level: Some college, no degree   Tobacco Use    Smoking status: Never    Smokeless tobacco: Never   Vaping Use    Vaping status: Never Used   Substance and Sexual Activity    Alcohol use: Yes     Alcohol/week: 1.0 standard drink of alcohol     Types: 1 Cans of beer per week     Comment: Social    Drug use: No    Sexual activity: Not Currently     Partners: Male     Birth control/protection: Condom     Past Medical History:   Diagnosis Date    Abnormal Pap smear of cervix 07/26/2019    LSIL    Anxiety     COVID 2020 2020 and 2022    Depression     Nexplanon in placed on 10/25/2019 10/25/2019    Removed 2/2021    Strep throat       Past Surgical History:   Procedure Laterality Date    TONSILLECTOMY  2019      No Known Allergies   Family History   Problem Relation Age of Onset    Hyperlipidemia Mother     No Known Problems Father     Cirrhosis Maternal Uncle         alcohol    Pancreatic cancer Maternal Uncle     Hypertension Maternal Grandmother     Stroke Neg Hx     Heart attack Neg Hx     Breast cancer Neg Hx     Cervical cancer Neg Hx     Lung cancer Neg Hx     Ovarian cancer Neg Hx     Colon cancer Neg Hx        Current Outpatient Medications:     cephalexin (KEFLEX) 500 MG capsule, Take 1 capsule by mouth 2 (Two) Times a Day for 7 days., Disp: 14  "capsule, Rfl: 0    hydrOXYzine pamoate (VISTARIL) 25 MG capsule, Take 1-2 pills three times a day if needed for anxiety, Disp: 90 capsule, Rfl: 1    mupirocin (BACTROBAN) 2 % ointment, Apply 1 Application topically to the appropriate area as directed 2 (Two) Times a Day for 7 days., Disp: 22 g, Rfl: 0    MAG THREONATE-NIACINAMIDE ER PO, Take 2 capsules by mouth Daily., Disp: , Rfl:     Multiple Vitamins-Minerals (b complex-C-E-zinc) tablet, Take 1 tablet by mouth Daily., Disp: , Rfl:     Omega-3 Fatty Acids (FISH OIL PO), Take  by mouth., Disp: , Rfl:     vitamin D3 125 MCG (5000 UT) capsule capsule, Take 1 capsule by mouth Daily., Disp: , Rfl:                   /74 (BP Location: Right arm, Patient Position: Sitting, Cuff Size: Adult)   Pulse 64   Temp 96.9 °F (36.1 °C)   Resp 16   Ht 157.5 cm (62\")   Wt 82.6 kg (182 lb)   LMP 04/15/2024 (Exact Date)   SpO2 98%   Breastfeeding No   BMI 33.29 kg/m²     Physical Exam  Vitals and nursing note reviewed.   Constitutional:       General: She is not in acute distress.     Appearance: Normal appearance. She is well-developed. She is not ill-appearing or diaphoretic.   HENT:      Head: Normocephalic and atraumatic.   Eyes:      General: No scleral icterus.     Conjunctiva/sclera: Conjunctivae normal.   Neck:      Vascular: No JVD.   Cardiovascular:      Rate and Rhythm: Normal rate and regular rhythm.      Chest Wall: PMI is not displaced. No thrill.      Heart sounds: Normal heart sounds. No murmur heard.  Pulmonary:      Effort: Pulmonary effort is normal. No accessory muscle usage or respiratory distress.      Breath sounds: Normal breath sounds.   Chest:      Chest wall: No tenderness.   Abdominal:      General: Bowel sounds are normal. There is no distension.      Palpations: Abdomen is soft.      Tenderness: There is no abdominal tenderness.   Musculoskeletal:         General: Normal range of motion.      Cervical back: Normal range of motion.      Right " lower leg: No edema.      Left lower leg: No edema.   Skin:     General: Skin is warm and dry.      Coloration: Skin is not ashen, jaundiced, mottled or pale.      Findings: No erythema.   Neurological:      General: No focal deficit present.      Mental Status: She is alert and oriented to person, place, and time.   Psychiatric:         Attention and Perception: Attention normal.         Mood and Affect: Mood and affect normal.         Behavior: Behavior normal. Behavior is cooperative.         Thought Content: Thought content normal.         Cognition and Memory: Cognition normal.         Judgment: Judgment normal.                  Diagnoses and all orders for this visit:    1. Annual visit for general adult medical examination with abnormal findings (Primary)  -     CBC (No Diff); Future  -     Comprehensive Metabolic Panel; Future  -     Lipid Panel; Future  -     Vitamin D,25-Hydroxy; Future    2. Vitamin D deficiency  -     CBC (No Diff); Future  -     Comprehensive Metabolic Panel; Future  -     Lipid Panel; Future  -     Vitamin D,25-Hydroxy; Future    3. Anxiety  -     CBC (No Diff); Future  -     Comprehensive Metabolic Panel; Future  -     Lipid Panel; Future  -     Vitamin D,25-Hydroxy; Future    4. Stress incontinence  -     CBC (No Diff); Future  -     Comprehensive Metabolic Panel; Future  -     Lipid Panel; Future  -     Vitamin D,25-Hydroxy; Future    5. Obesity (BMI 30.0-34.9)  -     CBC (No Diff); Future  -     Comprehensive Metabolic Panel; Future  -     Lipid Panel; Future  -     Vitamin D,25-Hydroxy; Future        Assessment & Plan  1. Routine physical examination.  The patient's vital signs are within normal limits, however, there is a slight increase in her weight. The patient has opted not to receive the COVID-19 vaccine at this time. She will receive her influenza vaccine in 10/2025. Her tetanus vaccinations are current, and she is not due again until 2029. She has completed both doses of  the hepatitis A vaccine series. She has been screened for hepatitis C in 2023 and 2024, both of which yielded negative results. Pneumonia vaccination is not indicated for her at this time. She will consult with her gynecologist regarding the second dose of the HPV vaccine. She will maintain her current exercise regimen, which includes walking her dog and hiking, and to incorporate fruits and vegetables into her diet. She will also focus on weight reduction and regular exercise. Routine labs will be conducted today, including CBC, CMP, and a lipid panel. Her vitamin D levels will also be checked. She will consider using compression stockings at work.    2. Suprapubic abscess.  The patient's culture did not reveal any bacterial growth. She will persist with her pelvic floor physical therapy regimen. She will consult with her gynecologist if her symptoms persist.    3. Cyst on the left eye.  The cyst may have been obstructed in her tear duct. She was advised to monitor the cyst. Should the cyst recur, she will seek further evaluation.         Labs  ordered as above- will notify of results and treat accordingly. If patient has not received results within one week via mychart or letter, they will notify my office  Immunizations and screenings:   Other preventative/screenings are up-to-date/addressed as noted in the HPI.        Follow up: Return in about 1 year (around 5/3/2025) for Annual, and need to collect labs today.  Plan of care discussed with patient. They verbalized understanding and agreement.     Electronically signed by : YEE Saavedra    5/3/2024   12:25 EDT           Patient or patient representative verbalized consent for the use of Ambient Listening during the visit with  YEE Saavedra for chart documentation. 5/3/2024  12:25 EDT

## 2024-05-30 ENCOUNTER — LAB (OUTPATIENT)
Dept: INTERNAL MEDICINE | Facility: CLINIC | Age: 29
End: 2024-05-30
Payer: COMMERCIAL

## 2024-05-30 DIAGNOSIS — E55.9 VITAMIN D DEFICIENCY: ICD-10-CM

## 2024-05-30 DIAGNOSIS — Z00.01 ANNUAL VISIT FOR GENERAL ADULT MEDICAL EXAMINATION WITH ABNORMAL FINDINGS: ICD-10-CM

## 2024-05-30 DIAGNOSIS — F41.9 ANXIETY: Chronic | ICD-10-CM

## 2024-05-30 DIAGNOSIS — N39.3 STRESS INCONTINENCE: ICD-10-CM

## 2024-05-30 DIAGNOSIS — E66.9 OBESITY (BMI 30.0-34.9): ICD-10-CM

## 2024-05-30 LAB
25(OH)D3 SERPL-MCNC: 21.3 NG/ML (ref 30–100)
ALBUMIN SERPL-MCNC: 4.5 G/DL (ref 3.5–5.2)
ALBUMIN/GLOB SERPL: 1.4 G/DL
ALP SERPL-CCNC: 71 U/L (ref 39–117)
ALT SERPL W P-5'-P-CCNC: 31 U/L (ref 1–33)
ANION GAP SERPL CALCULATED.3IONS-SCNC: 9 MMOL/L (ref 5–15)
AST SERPL-CCNC: 26 U/L (ref 1–32)
BILIRUB SERPL-MCNC: 0.4 MG/DL (ref 0–1.2)
BUN SERPL-MCNC: 9 MG/DL (ref 6–20)
BUN/CREAT SERPL: 11.7 (ref 7–25)
CALCIUM SPEC-SCNC: 9.3 MG/DL (ref 8.6–10.5)
CHLORIDE SERPL-SCNC: 101 MMOL/L (ref 98–107)
CHOLEST SERPL-MCNC: 216 MG/DL (ref 0–200)
CO2 SERPL-SCNC: 28 MMOL/L (ref 22–29)
CREAT SERPL-MCNC: 0.77 MG/DL (ref 0.57–1)
DEPRECATED RDW RBC AUTO: 39.4 FL (ref 37–54)
EGFRCR SERPLBLD CKD-EPI 2021: 107.2 ML/MIN/1.73
ERYTHROCYTE [DISTWIDTH] IN BLOOD BY AUTOMATED COUNT: 12.4 % (ref 12.3–15.4)
GLOBULIN UR ELPH-MCNC: 3.2 GM/DL
GLUCOSE SERPL-MCNC: 98 MG/DL (ref 65–99)
HCT VFR BLD AUTO: 43.6 % (ref 34–46.6)
HDLC SERPL-MCNC: 47 MG/DL (ref 40–60)
HGB BLD-MCNC: 14.5 G/DL (ref 12–15.9)
LDLC SERPL CALC-MCNC: 153 MG/DL (ref 0–100)
LDLC/HDLC SERPL: 3.21 {RATIO}
MCH RBC QN AUTO: 29 PG (ref 26.6–33)
MCHC RBC AUTO-ENTMCNC: 33.3 G/DL (ref 31.5–35.7)
MCV RBC AUTO: 87.2 FL (ref 79–97)
PLATELET # BLD AUTO: 299 10*3/MM3 (ref 140–450)
PMV BLD AUTO: 10.4 FL (ref 6–12)
POTASSIUM SERPL-SCNC: 4.3 MMOL/L (ref 3.5–5.2)
PROT SERPL-MCNC: 7.7 G/DL (ref 6–8.5)
RBC # BLD AUTO: 5 10*6/MM3 (ref 3.77–5.28)
SODIUM SERPL-SCNC: 138 MMOL/L (ref 136–145)
TRIGL SERPL-MCNC: 90 MG/DL (ref 0–150)
VLDLC SERPL-MCNC: 16 MG/DL (ref 5–40)
WBC NRBC COR # BLD AUTO: 7.12 10*3/MM3 (ref 3.4–10.8)

## 2024-05-30 PROCEDURE — 36415 COLL VENOUS BLD VENIPUNCTURE: CPT | Performed by: NURSE PRACTITIONER

## 2024-05-30 PROCEDURE — 82306 VITAMIN D 25 HYDROXY: CPT | Performed by: NURSE PRACTITIONER

## 2024-05-30 PROCEDURE — 85027 COMPLETE CBC AUTOMATED: CPT | Performed by: NURSE PRACTITIONER

## 2024-05-30 PROCEDURE — 80053 COMPREHEN METABOLIC PANEL: CPT | Performed by: NURSE PRACTITIONER

## 2024-05-30 PROCEDURE — 80061 LIPID PANEL: CPT | Performed by: NURSE PRACTITIONER

## 2024-06-17 ENCOUNTER — TELEPHONE (OUTPATIENT)
Dept: INTERNAL MEDICINE | Facility: CLINIC | Age: 29
End: 2024-06-17
Payer: COMMERCIAL

## 2024-06-17 DIAGNOSIS — E55.9 VITAMIN D DEFICIENCY: Primary | ICD-10-CM

## 2024-06-17 RX ORDER — CHOLECALCIFEROL (VITAMIN D3) 1250 MCG
50000 CAPSULE ORAL
Qty: 8 CAPSULE | Refills: 0 | Status: SHIPPED | OUTPATIENT
Start: 2024-06-17 | End: 2024-08-06

## 2024-06-17 NOTE — TELEPHONE ENCOUNTER
Your labs show that your vitamin D is low.  I will send in vitamin D3 50,000 units for you to take once a week for 8 weeks.  Once you complete that you will need to switch over to vitamin D3 5000 units over-the-counter daily.  Your labs also show that your cholesterol is worsening.  The rest of your labs are in acceptable parameters.  You should consume lean meats, whole grains, vegetables, and fruits, while avoiding concentrated sweets, junk foods, and fast foods.  You should engage in a minimum of 150 minutes of moderate intensity exercise per week as well.  Otherwise, please follow your current treatment plan and medications.   Written by YEE Reese on 6/4/2024 11:53 AM EDT    Pt has been advised of results and verbalized good understanding.